# Patient Record
Sex: MALE | URBAN - NONMETROPOLITAN AREA
[De-identification: names, ages, dates, MRNs, and addresses within clinical notes are randomized per-mention and may not be internally consistent; named-entity substitution may affect disease eponyms.]

---

## 2017-01-13 ENCOUNTER — APPOINTMENT (RX ONLY)
Dept: URBAN - NONMETROPOLITAN AREA CLINIC 3 | Facility: CLINIC | Age: 77
Setting detail: DERMATOLOGY
End: 2017-01-13

## 2017-01-13 DIAGNOSIS — L57.0 ACTINIC KERATOSIS: ICD-10-CM

## 2017-01-13 DIAGNOSIS — D18.0 HEMANGIOMA: ICD-10-CM

## 2017-01-13 DIAGNOSIS — L81.4 OTHER MELANIN HYPERPIGMENTATION: ICD-10-CM

## 2017-01-13 DIAGNOSIS — L82.1 OTHER SEBORRHEIC KERATOSIS: ICD-10-CM

## 2017-01-13 DIAGNOSIS — D22 MELANOCYTIC NEVI: ICD-10-CM

## 2017-01-13 PROBLEM — D18.01 HEMANGIOMA OF SKIN AND SUBCUTANEOUS TISSUE: Status: ACTIVE | Noted: 2017-01-13

## 2017-01-13 PROBLEM — D22.9 MELANOCYTIC NEVI, UNSPECIFIED: Status: ACTIVE | Noted: 2017-01-13

## 2017-01-13 PROBLEM — D48.5 NEOPLASM OF UNCERTAIN BEHAVIOR OF SKIN: Status: ACTIVE | Noted: 2017-01-13

## 2017-01-13 PROBLEM — L57.8 OTHER SKIN CHANGES DUE TO CHRONIC EXPOSURE TO NONIONIZING RADIATION: Status: ACTIVE | Noted: 2017-01-13

## 2017-01-13 PROBLEM — L70.0 ACNE VULGARIS: Status: ACTIVE | Noted: 2017-01-13

## 2017-01-13 PROCEDURE — 99203 OFFICE O/P NEW LOW 30 MIN: CPT | Mod: 25

## 2017-01-13 PROCEDURE — 11100: CPT | Mod: 59

## 2017-01-13 PROCEDURE — 11101: CPT

## 2017-01-13 PROCEDURE — ? COUNSELING

## 2017-01-13 PROCEDURE — ? BIOPSY BY SHAVE METHOD

## 2017-01-13 PROCEDURE — 11302 SHAVE SKIN LESION 1.1-2.0 CM: CPT | Mod: 59

## 2017-01-13 PROCEDURE — ? SHAVE REMOVAL

## 2017-01-13 PROCEDURE — 17004 DESTROY PREMAL LESIONS 15/>: CPT

## 2017-01-13 PROCEDURE — ? LIQUID NITROGEN

## 2017-01-13 ASSESSMENT — LOCATION SIMPLE DESCRIPTION DERM
LOCATION SIMPLE: LEFT EAR
LOCATION SIMPLE: RIGHT FOREHEAD
LOCATION SIMPLE: RIGHT BUTTOCK
LOCATION SIMPLE: LEFT FOREHEAD
LOCATION SIMPLE: LEFT CHEEK

## 2017-01-13 ASSESSMENT — LOCATION DETAILED DESCRIPTION DERM
LOCATION DETAILED: LEFT SUPERIOR CRUS OF ANTIHELIX
LOCATION DETAILED: LEFT INFERIOR LATERAL FOREHEAD
LOCATION DETAILED: RIGHT LATERAL FOREHEAD
LOCATION DETAILED: RIGHT BUTTOCK
LOCATION DETAILED: LEFT MID PREAURICULAR CHEEK

## 2017-01-13 ASSESSMENT — PAIN INTENSITY VAS: HOW INTENSE IS YOUR PAIN 0 BEING NO PAIN, 10 BEING THE MOST SEVERE PAIN POSSIBLE?: NO PAIN

## 2017-01-13 ASSESSMENT — LOCATION ZONE DERM
LOCATION ZONE: TRUNK
LOCATION ZONE: FACE
LOCATION ZONE: EAR

## 2017-01-13 ASSESSMENT — TOTAL NUMBER OF LESIONS: # OF LESIONS?: 22

## 2017-01-13 NOTE — PROCEDURE: LIQUID NITROGEN
Total Number Of Aks Treated: [facility]
Duration Of Freeze Thaw-Cycle (Seconds): 0
Render Post-Care Instructions In Note?: no
Detail Level: Detailed
Post-Care Instructions: I reviewed with the patient in detail post-care instructions. Patient is to wear sunprotection, and avoid picking at any of the treated lesions. Pt may apply Vaseline to crusted or scabbing areas.
Consent: The patient's consent was obtained including but not limited to risks of crusting, scabbing, blistering, scarring, darker or lighter pigmentary change, recurrence, incomplete removal and infection.

## 2017-01-13 NOTE — PROCEDURE: BIOPSY BY SHAVE METHOD
Anesthesia Type: 1% lidocaine with epinephrine
Destruction After The Procedure: No
Additional Anesthesia Volume In Cc (Will Not Render If 0): 0
Cryotherapy Text: The wound bed was treated with cryotherapy after the biopsy was performed.
Post-Care Instructions: I reviewed with the patient in detail post-care instructions. Patient is to keep the biopsy site dry overnight, and then apply bacitracin twice daily until healed. Patient may apply hydrogen peroxide soaks to remove any crusting.
Hemostasis: Electrocautery
Type Of Destruction Used: Curettage
Detail Level: Detailed
Render Post-Care Instructions In Note?: yes
Size Of Lesion In Cm: 0.5
Silver Nitrate Text: The wound bed was treated with silver nitrate after the biopsy was performed.
Dressing: bandage
Electrodesiccation And Curettage Text: The wound bed was treated with electrodesiccation and curettage after the biopsy was performed.
Billing Type: [employer]
Wound Care: Bacitracin
Lab: [id number] [facility]
Biopsy Type: H and E
Notification Instructions: Patient will be notified of biopsy results. However, patient instructed to call the office if not contacted within 2 weeks.
Biopsy Method: 15 blade
Electrodesiccation Text: The wound bed was treated with electrodesiccation after the biopsy was performed.
Lab Facility: [address] [place] [address]
Path Notes (To The Dermatopathologist): Size:0.5
Curettage Text: The wound bed was treated with curettage after the biopsy was performed.
Body Location Override (Optional - Billing Will Still Be Based On Selected Body Map Location If Applicable): Right forehead
Consent: Written consent was obtained and risks were reviewed including but not limited to scarring, infection, bleeding, scabbing, incomplete removal, nerve damage and allergy to anesthesia.
Billing Type: Third-Party Bill
Lab Facility: [id number]
Lab: [id number]
Body Location Override (Optional - Billing Will Still Be Based On Selected Body Map Location If Applicable): Left dorsal forearm
Body Location Override (Optional - Billing Will Still Be Based On Selected Body Map Location If Applicable): Left abdomen
Body Location Override (Optional - Billing Will Still Be Based On Selected Body Map Location If Applicable): Left lateral deltoid

## 2017-01-13 NOTE — PROCEDURE: SHAVE REMOVAL
Wound Care: Bacitracin
Anesthesia Type: 1% lidocaine with epinephrine
Anesthesia Volume In Cc: 0.3
Post-Care Instructions: I reviewed with the patient in detail post-care instructions. Patient is to keep the biopsy site dry overnight, and then apply bacitracin twice daily until healed. Patient may apply hydrogen peroxide soaks to remove any crusting.
Body Location Override (Optional - Billing Will Still Be Based On Selected Body Map Location If Applicable): Right buttock
Notification Instructions: Patient will be notified of biopsy results. However, patient instructed to call the office if not contacted within 2 weeks.
Medical Necessity Clause: This procedure was medically necessary because the lesion that was treated was:
Bill 27980 For Specimen Handling/Conveyance To Laboratory?: no
Render Post-Care Instructions In Note?: yes
X Size Of Lesion In Cm (Optional): 0
Hemostasis: Electrocautery
Billing Type: [employer]
Size Of Lesion In Cm (Required): 1.1
Medical Necessity Information: It is in your best interest to select a reason for this procedure from the list below. All of these items fulfill various CMS LCD requirements except the new and changing color options.
Consent was obtained from the patient. The risks and benefits to therapy were discussed in detail. Specifically, the risks of infection, scarring, bleeding, prolonged wound healing, incomplete removal, allergy to anesthesia, nerve injury and recurrence were addressed. Prior to the procedure, the treatment site was clearly identified and confirmed by the patient. All components of Universal Protocol/PAUSE Rule completed.
Path Notes (To The Dermatopathologist): Size:1.1
Detail Level: Detailed
Lab Facility: [address] [place] [address]
Lab: [id number]
Biopsy Method: 15 blade

## 2017-01-13 NOTE — PROCEDURE: MIPS QUALITY
Quality 111:Pneumonia Vaccination Status For Older Adults: Pneumococcal Vaccination Previously Received
Quality 431: Preventive Care And Screening: Unhealthy Alcohol Use - Screening: Patient screened for unhealthy alcohol use using a single question and scores less than 2 times per year
Additional Notes: Flu- nov 2016 primary \\nPneumonia- few years ago\\nPOA- brother, Melissa Lloydal
Quality 47: Advance Care Plan: Advance Care Planning discussed and documented; advance care plan or surrogate decision maker documented in the medical record.
Quality 110: Preventive Care And Screening: Influenza Immunization: Influenza Immunization previously received during influenza season
Quality 130: Documentation Of Current Medications In The Medical Record: Current Medications Documented
Quality 402: Tobacco Use And Help With Quitting Among Adolescents: Patient screened for tobacco and is an ex-smoker
Detail Level: Zone

## 2017-02-13 ENCOUNTER — APPOINTMENT (RX ONLY)
Dept: URBAN - NONMETROPOLITAN AREA CLINIC 3 | Facility: CLINIC | Age: 77
Setting detail: DERMATOLOGY
End: 2017-02-13

## 2017-02-13 PROBLEM — C44.619 BASAL CELL CARCINOMA OF SKIN OF LEFT UPPER LIMB, INCLUDING SHOULDER: Status: ACTIVE | Noted: 2017-02-13

## 2017-02-13 PROCEDURE — 13121 CMPLX RPR S/A/L 2.6-7.5 CM: CPT

## 2017-02-13 PROCEDURE — ? COUNSELING

## 2017-02-13 PROCEDURE — A4550 SURGICAL TRAYS: HCPCS

## 2017-02-13 PROCEDURE — 17313 MOHS 1 STAGE T/A/L: CPT

## 2017-02-13 PROCEDURE — ? MOHS SURGERY

## 2017-02-13 NOTE — HPI: PROCEDURE (MOHS)
Has The Growth Been Previously Biopsied?: has been previously biopsied
Body Location Override (Optional): Left lateral deltoid

## 2017-02-13 NOTE — PROCEDURE: MOHS SURGERY
Eye Shield Used: No
Dressing: pressure dressing
Full Thickness Lip Wedge Repair (Flap) Text: Given the location of the defect and the proximity to free margins a full thickness wedge repair was deemed most appropriate. Using a sterile surgical marker, the appropriate repair was drawn incorporating the defect and placing the expected incisions perpendicular to the vermilion border. The vermilion border was also meticulously outlined to ensure appropriate reapproximation during the repair. The area thus outlined was incised through and through with a #15 scalpel blade. The muscularis and dermis were reaproximated with deep sutures following hemostasis. Care was taken to realign the vermilion border before proceeding with the superficial closure. Once the vermilion was realigned the superfical and mucosal closure was finished.
Consent Type: Consent 1 (Standard)
Mauc Instructions: By selecting yes to the question below the [facility] number will be added into the note. This will be calculated automatically based on the diagnosis chosen, the size entered, the body zone selected (H,M,L) and the specific indications you chose. You will also have the option to override the Mohs AUC if you disagree with the automatically calculated number and this option is found in the Case Summary tab.
Additional Primary Defect Length (In Cm): -
Bcc Infiltrative Histology Text: There were numerous aggregates of basaloid cells demonstrating an infiltrative pattern.
Keystone Flap Text: The defect edges were debeveled with a #15 scalpel blade. Given the location of the defect, shape of the defect a keystone flap was deemed most appropriate. Using a sterile surgical marker, an appropriate keystone flap was drawn incorporating the defect, outlining the appropriate donor tissue and placing the expected incisions within the relaxed skin tension lines where possible. The area thus outlined was incised deep to adipose tissue with a #15 scalpel blade. The skin margins were undermined to an appropriate distance in all directions around the primary defect and laterally outward around the flap utilizing iris scissors.
Split-Thickness Skin Graft Text: The defect edges were debeveled with a #15 scalpel blade. Given the location of the defect, shape of the defect and the proximity to free margins a split thickness skin graft was deemed most appropriate. Using a sterile surgical marker, the primary defect shape was transferred to the donor site. The split thickness graft was then harvested. The skin graft was then placed in the primary defect and oriented appropriately.
Melolabial Interpolation Flap Text: A decision was made to reconstruct the defect utilizing an interpolation axial flap and a staged reconstruction. A telfa template was made of the defect. This telfa template was then used to outline the melolabial interpolation flap. The donor area for the pedicle flap was then injected with anesthesia. The flap was excised through the skin and subcutaneous tissue down to the layer of the underlying musculature. The pedicle flap was carefully excised within this deep plane to maintain its blood supply. The edges of the donor site were undermined. The donor site was closed in a primary fashion. The pedicle was then rotated into position and sutured. Once the tube was sutured into place, adequate blood supply was confirmed with blanching and refill. The pedicle was then wrapped with xeroform gauze and dressed appropriately with a telfa and gauze bandage to ensure continued blood supply and protect the attached pedicle.
Consent (Lip)/Introductory Paragraph: The rationale for Mohs was explained to the patient and consent was obtained. The risks, benefits and alternatives to therapy were discussed in detail. Specifically, the risks of lip deformity, changes in the oral aperture, infection, scarring, bleeding, prolonged wound healing, incomplete removal, allergy to anesthesia, nerve injury and recurrence were addressed. Prior to the procedure, the treatment site was clearly identified and confirmed by the patient. All components of Universal Protocol/PAUSE Rule completed.
Repair Performed By Another Provider Text (Leave Blank If You Do Not Want): After obtaining clear surgical margins the defect was repaired by another provider.
Stage 8: Additional Anesthesia Type: 1% lidocaine with epinephrine
S Plasty Text: Given the location and shape of the defect, and the orientation of relaxed skin tension lines, an S-plasty was deemed most appropriate for repair. Using a sterile surgical marker, the appropriate outline of the S-plasty was drawn, incorporating the defect and placing the expected incisions within the relaxed skin tension lines where possible. The area thus outlined was incised deep to adipose tissue with a #15 scalpel blade. The skin margins were undermined to an appropriate distance in all directions utilizing iris scissors. The skin flaps were advanced over the defect. The opposing margins were then approximated with interrupted buried subcutaneous sutures.
Quadrants Reporting?: 0
Number Of Stages: 1
Crescentic Intermediate Repair Preamble Text (Leave Blank If You Do Not Want): Undermining was performed with blunt dissection.
Purse String (Intermediate) Text: Given the location of the defect and the characteristics of the surrounding skin a pursestring intermediate closure was deemed most appropriate. Undermining was performed circumfirentially around the surgical defect. A purstring suture was then placed and tightened.
Body Location Override (Optional - Billing Will Still Be Based On Selected Body Map Location If Applicable): Left lateral deltoid
Bilobed Transposition Flap Text: The defect edges were debeveled with a #15 scalpel blade. Given the location of the defect and the proximity to free margins a bilobed transposition flap was deemed most appropriate. Using a sterile surgical marker, an appropriate bilobe flap drawn around the defect. The area thus outlined was incised deep to adipose tissue with a #15 scalpel blade. The skin margins were undermined to an appropriate distance in all directions utilizing iris scissors.
Skin Substitute Text: The defect edges were debeveled with a #15 scalpel blade. Given the location of the defect, shape of the defect and the proximity to free margins a skin substitute graft was deemed most appropriate. The graft material was trimmed to fit the size of the defect. The graft was then placed in the primary defect and oriented appropriately.
Suture Removal: 14 days
Spiral Flap Text: The defect edges were debeveled with a #15 scalpel blade. Given the location of the defect, shape of the defect and the proximity to free margins a spiral flap was deemed most appropriate. Using a sterile surgical marker, an appropriate rotation flap was drawn incorporating the defect and placing the expected incisions within the relaxed skin tension lines where possible. The area thus outlined was incised deep to adipose tissue with a #15 scalpel blade. The skin margins were undermined to an appropriate distance in all directions utilizing iris scissors.
Cheek Interpolation Flap Text: A decision was made to reconstruct the defect utilizing an interpolation axial flap and a staged reconstruction. A telfa template was made of the defect. This telfa template was then used to outline the Cheek Interpolation flap. The donor area for the pedicle flap was then injected with anesthesia. The flap was excised through the skin and subcutaneous tissue down to the layer of the underlying musculature. The interpolation flap was carefully excised within this deep plane to maintain its blood supply. The edges of the donor site were undermined. The donor site was closed in a primary fashion. The pedicle was then rotated into position and sutured. Once the tube was sutured into place, adequate blood supply was confirmed with blanching and refill. The pedicle was then wrapped with xeroform gauze and dressed appropriately with a telfa and gauze bandage to ensure continued blood supply and protect the attached pedicle.
Melolabial Transposition Flap Text: The defect edges were debeveled with a #15 scalpel blade. Given the location of the defect and the proximity to free margins a melolabial flap was deemed most appropriate. Using a sterile surgical marker, an appropriate melolabial transposition flap was drawn incorporating the defect. The area thus outlined was incised deep to adipose tissue with a #15 scalpel blade. The skin margins were undermined to an appropriate distance in all directions utilizing iris scissors.
Posterior Auricular Interpolation Flap Text: A decision was made to reconstruct the defect utilizing an interpolation axial flap and a staged reconstruction. A telfa template was made of the defect. This telfa template was then used to outline the posterior auricular interpolation flap. The donor area for the pedicle flap was then injected with anesthesia. The flap was excised through the skin and subcutaneous tissue down to the layer of the underlying musculature. The pedicle flap was carefully excised within this deep plane to maintain its blood supply. The edges of the donor site were undermined. The donor site was closed in a primary fashion. The pedicle was then rotated into position and sutured. Once the tube was sutured into place, adequate blood supply was confirmed with blanching and refill. The pedicle was then wrapped with xeroform gauze and dressed appropriately with a telfa and gauze bandage to ensure continued blood supply and protect the attached pedicle.
Modified Advancement Flap Text: The defect edges were debeveled with a #15 scalpel blade. Given the location of the defect, shape of the defect and the proximity to free margins a modified advancement flap was deemed most appropriate. Using a sterile surgical marker, an appropriate advancement flap was drawn incorporating the defect and placing the expected incisions within the relaxed skin tension lines where possible. The area thus outlined was incised deep to adipose tissue with a #15 scalpel blade. The skin margins were undermined to an appropriate distance in all directions utilizing iris scissors.
Bilateral Helical Rim Advancement Flap Text: The defect edges were debeveled with a #15 blade scalpel. Given the location of the defect and the proximity to free margins (helical rim) a bilateral helical rim advancement flap was deemed most appropriate. Using a sterile surgical marker, the appropriate advancement flaps were drawn incorporating the defect and placing the expected incisions between the helical rim and antihelix where possible. The area thus outlined was incised through and through with a #15 scalpel blade. With a skin hook and iris scissors, the flaps were gently and sharply undermined and freed up.
Consent 3/Introductory Paragraph: I gave the patient a chance to ask questions they had about the procedure. Following this I explained the Mohs procedure and consent was obtained. The risks, benefits and alternatives to therapy were discussed in detail. Specifically, the risks of infection, scarring, bleeding, prolonged wound healing, incomplete removal, allergy to anesthesia, nerve injury and recurrence were addressed. Prior to the procedure, the treatment site was clearly identified and confirmed by the patient. All components of Universal Protocol/PAUSE Rule completed.
Consent (Marginal Mandibular)/Introductory Paragraph: The rationale for Mohs was explained to the patient and consent was obtained. The risks, benefits and alternatives to therapy were discussed in detail. Specifically, the risks of damage to the marginal mandibular branch of the facial nerve, infection, scarring, bleeding, prolonged wound healing, incomplete removal, allergy to anesthesia, and recurrence were addressed. Prior to the procedure, the treatment site was clearly identified and confirmed by the patient. All components of Universal Protocol/PAUSE Rule completed.
Xenograft Text: The defect edges were debeveled with a #15 scalpel blade. Given the location of the defect, shape of the defect and the proximity to free margins a xenograft was deemed most appropriate. The graft was then trimmed to fit the size of the defect. The graft was then placed in the primary defect and oriented appropriately.
Island Pedicle Flap Text: The defect edges were debeveled with a #15 scalpel blade. Given the location of the defect, shape of the defect and the proximity to free margins an island pedicle advancement flap was deemed most appropriate. Using a sterile surgical marker, an appropriate advancement flap was drawn incorporating the defect, outlining the appropriate donor tissue and placing the expected incisions within the relaxed skin tension lines where possible. The area thus outlined was incised deep to adipose tissue with a #15 scalpel blade. The skin margins were undermined to an appropriate distance in all directions around the primary defect and laterally outward around the island pedicle utilizing iris scissors. There was minimal undermining beneath the pedicle flap.
Island Pedicle Flap With Canthal Suspension Text: The defect edges were debeveled with a #15 scalpel blade. Given the location of the defect, shape of the defect and the proximity to free margins an island pedicle advancement flap was deemed most appropriate. Using a sterile surgical marker, an appropriate advancement flap was drawn incorporating the defect, outlining the appropriate donor tissue and placing the expected incisions within the relaxed skin tension lines where possible. The area thus outlined was incised deep to adipose tissue with a #15 scalpel blade. The skin margins were undermined to an appropriate distance in all directions around the primary defect and laterally outward around the island pedicle utilizing iris scissors. There was minimal undermining beneath the pedicle flap. A suspension suture was placed in the canthal tendon to prevent tension and prevent ectropion.
Double Island Pedicle Flap Text: The defect edges were debeveled with a #15 scalpel blade. Given the location of the defect, shape of the defect and the proximity to free margins a double island pedicle advancement flap was deemed most appropriate. Using a sterile surgical marker, an appropriate advancement flap was drawn incorporating the defect, outlining the appropriate donor tissue and placing the expected incisions within the relaxed skin tension lines where possible. The area thus outlined was incised deep to adipose tissue with a #15 scalpel blade. The skin margins were undermined to an appropriate distance in all directions around the primary defect and laterally outward around the island pedicle utilizing iris scissors. There was minimal undermining beneath the pedicle flap.
Surgical Defect Width In Cm (Optional): 1.6
Inflammation Suggestive Of Cancer Camouflage Histology Text: There was a dense lymphocytic infiltrate which prevented adequate histologic evaluation of adjacent structures.
Epidermal Sutures: 4-0 Nylon
Epidermal Autograft Text: The defect edges were debeveled with a #15 scalpel blade. Given the location of the defect, shape of the defect and the proximity to free margins an epidermal autograft was deemed most appropriate. Using a sterile surgical marker, the primary defect shape was transferred to the donor site. The epidermal graft was then harvested. The skin graft was then placed in the primary defect and oriented appropriately.
Consent 1/Introductory Paragraph: The rationale for Mohs was explained to the patient and consent was obtained. The risks, benefits and alternatives to therapy were discussed in detail. Specifically, the risks of infection, scarring, bleeding, prolonged wound healing, incomplete removal, allergy to anesthesia, nerve injury and recurrence were addressed. Prior to the procedure, the treatment site was clearly identified and confirmed by the patient. All components of Universal Protocol/PAUSE Rule completed.
O-T Plasty Text: The defect edges were debeveled with a #15 scalpel blade. Given the location of the defect, shape of the defect and the proximity to free margins an O-T plasty was deemed most appropriate. Using a sterile surgical marker, an appropriate O-T plasty was drawn incorporating the defect and placing the expected incisions within the relaxed skin tension lines where possible. The area thus outlined was incised deep to adipose tissue with a #15 scalpel blade. The skin margins were undermined to an appropriate distance in all directions utilizing iris scissors.
Bi-Rhombic Flap Text: The defect edges were debeveled with a #15 scalpel blade. Given the location of the defect and the proximity to free margins a bi-rhombic flap was deemed most appropriate. Using a sterile surgical marker, an appropriate rhombic flap was drawn incorporating the defect. The area thus outlined was incised deep to adipose tissue with a #15 scalpel blade. The skin margins were undermined to an appropriate distance in all directions utilizing iris scissors.
Subsequent Stages Histo Method Verbiage: Using a similar technique to that described above, a thin layer of tissue was removed from all areas where tumor was visible on the previous stage. The tissue was again oriented, mapped, dyed, and processed as above.
Referred To Plastics For Closure Text (Leave Blank If You Do Not Want): After obtaining clear surgical margins the patient was sent to plastics for surgical repair. The patient understands they will receive post-surgical care and follow-up from the referring physician's office.
H Plasty Text: Given the location of the defect, shape of the defect and the proximity to free margins a H-plasty was deemed most appropriate for repair. Using a sterile surgical marker, the appropriate advancement arms of the H-plasty were drawn incorporating the defect and placing the expected incisions within the relaxed skin tension lines where possible. The area thus outlined was incised deep to adipose tissue with a #15 scalpel blade. The skin margins were undermined to an appropriate distance in all directions utilizing iris scissors. The opposing advancement arms were then advanced into place in opposite direction and anchored with interrupted buried subcutaneous sutures.
Initial Size Of Lesion: 1.7
Referred To Oculoplastics For Closure Text (Leave Blank If You Do Not Want): After obtaining clear surgical margins the patient was sent to oculoplastics for surgical repair. The patient understands they will receive post-surgical care and follow-up from the referring physician's office.
Eye Protection Verbiage: Before proceeding with the stage, a plastic scleral shield was inserted. The globe was anesthetized with a few drops of 1% lidocaine with 1:100,000 epinephrine. Then, an appropriate sized scleral shield was chosen and coated with lacrilube ointment. The shield was gently inserted and left in place for the duration of each stage. After the stage was completed, the shield was gently removed.
Repair Anesthesia Method: local infiltration
A-T Advancement Flap Text: The defect edges were debeveled with a #15 scalpel blade. Given the location of the defect, shape of the defect and the proximity to free margins an A-T advancement flap was deemed most appropriate. Using a sterile surgical marker, an appropriate advancement flap was drawn incorporating the defect and placing the expected incisions within the relaxed skin tension lines where possible. The area thus outlined was incised deep to adipose tissue with a #15 scalpel blade. The skin margins were undermined to an appropriate distance in all directions utilizing iris scissors.
Consent (Temporal Branch)/Introductory Paragraph: The rationale for Mohs was explained to the patient and consent was obtained. The risks, benefits and alternatives to therapy were discussed in detail. Specifically, the risks of damage to the temporal branch of the facial nerve, infection, scarring, bleeding, prolonged wound healing, incomplete removal, allergy to anesthesia, and recurrence were addressed. Prior to the procedure, the treatment site was clearly identified and confirmed by the patient. All components of Universal Protocol/PAUSE Rule completed.
Surgeon/Pathologist Verbiage (Will Incorporate Name Of Surgeon From Intro If Not Blank): operated in two distinct and integrated capacities as the surgeon and pathologist.
Same Histology In Subsequent Stages Text: The pattern and morphology of the tumor is as described in the first stage.
Repair Type: Complex Repair
Bcc Histology Text: There were numerous aggregates of basaloid cells.
Burow's Advancement Flap Text: The defect edges were debeveled with a #15 scalpel blade. Given the location of the defect and the proximity to free margins a Burow's advancement flap was deemed most appropriate. Using a sterile surgical marker, the appropriate advancement flap was drawn incorporating the defect and placing the expected incisions within the relaxed skin tension lines where possible. The area thus outlined was incised deep to adipose tissue with a #15 scalpel blade. The skin margins were undermined to an appropriate distance in all directions utilizing iris scissors.
Muscle Hinge Flap Text: The defect edges were debeveled with a #15 scalpel blade. Given the size, depth and location of the defect and the proximity to free margins a muscle hinge flap was deemed most appropriate. Using a sterile surgical marker, an appropriate hinge flap was drawn incorporating the defect. The area thus outlined was incised with a #15 scalpel blade. The skin margins were undermined to an appropriate distance in all directions utilizing iris scissors.
Complex Repair And Flap Additional Text (Will Appearing After The Standard Complex Repair Text): The complex repair was not sufficient to completely close the primary defect. The remaining additional defect was repaired with the flap mentioned below.
Consent (Near Eyelid Margin)/Introductory Paragraph: The rationale for Mohs was explained to the patient and consent was obtained. The risks, benefits and alternatives to therapy were discussed in detail. Specifically, the risks of ectropion or eyelid deformity, infection, scarring, bleeding, prolonged wound healing, incomplete removal, allergy to anesthesia, nerve injury and recurrence were addressed. Prior to the procedure, the treatment site was clearly identified and confirmed by the patient. All components of Universal Protocol/PAUSE Rule completed.
Cheiloplasty (Less Than 50%) Text: A decision was made to reconstruct the defect with a  cheiloplasty. The defect was undermined extensively. Additional obicularis oris muscle was excised with a 15 blade scalpel. The defect was converted into a full thickness wedge, of less than 50% of the vertical height of the lip, to facilite a better cosmetic result. Small vessels were then tied off with 5-0 monocyrl. The obicularis oris, superficial fascia, adipose and dermis were then reapproximated. After the deeper layers were approximated the epidermis was reapproximated with particular care given to realign the vermilion border.
Area M Indication Text: Tumors in this location are included in Area M (cheek, forehead, scalp, neck, jawline and pretibial skin). Mohs surgery is indicated for tumors in these anatomic locations.
Mohs Case Number: [id number]
V-Y Flap Text: The defect edges were debeveled with a #15 scalpel blade. Given the location of the defect, shape of the defect and the proximity to free margins a V-Y flap was deemed most appropriate. Using a sterile surgical marker, an appropriate advancement flap was drawn incorporating the defect and placing the expected incisions within the relaxed skin tension lines where possible. The area thus outlined was incised deep to adipose tissue with a #15 scalpel blade. The skin margins were undermined to an appropriate distance in all directions utilizing iris scissors.
Consent (Nose)/Introductory Paragraph: The rationale for Mohs was explained to the patient and consent was obtained. The risks, benefits and alternatives to therapy were discussed in detail. Specifically, the risks of nasal deformity, changes in the flow of air through the nose, infection, scarring, bleeding, prolonged wound healing, incomplete removal, allergy to anesthesia, nerve injury and recurrence were addressed. Prior to the procedure, the treatment site was clearly identified and confirmed by the patient. All components of Universal Protocol/PAUSE Rule completed.
Secondary Intention Text (Leave Blank If You Do Not Want): The defect will heal with secondary intention.
Z Plasty Text: The lesion was extirpated to the level of the fat with a #15 scalpel blade. Given the location of the defect, shape of the defect and the proximity to free margins a Z-plasty was deemed most appropriate for repair. Using a sterile surgical marker, the appropriate transposition arms of the Z-plasty were drawn incorporating the defect and placing the expected incisions within the relaxed skin tension lines where possible. The area thus outlined was incised deep to adipose tissue with a #15 scalpel blade. The skin margins were undermined to an appropriate distance in all directions utilizing iris scissors. The opposing transposition arms were then transposed into place in opposite direction and anchored with interrupted buried subcutaneous sutures.
Lazy S Complex Repair Preamble Text (Leave Blank If You Do Not Want): Extensive wide undermining was performed.
X Size Of Lesion In Cm (Optional): 1.3
Crescentic Advancement Flap Text: The defect edges were debeveled with a #15 scalpel blade. Given the location of the defect and the proximity to free margins a crescentic advancement flap was deemed most appropriate. Using a sterile surgical marker, the appropriate advancement flap was drawn incorporating the defect and placing the expected incisions within the relaxed skin tension lines where possible. The area thus outlined was incised deep to adipose tissue with a #15 scalpel blade. The skin margins were undermined to an appropriate distance in all directions utilizing iris scissors.
Mastoid Interpolation Flap Text: A decision was made to reconstruct the defect utilizing an interpolation axial flap and a staged reconstruction. A telfa template was made of the defect. This telfa template was then used to outline the mastoid interpolation flap. The donor area for the pedicle flap was then injected with anesthesia. The flap was excised through the skin and subcutaneous tissue down to the layer of the underlying musculature. The pedicle flap was carefully excised within this deep plane to maintain its blood supply. The edges of the donor site were undermined. The donor site was closed in a primary fashion. The pedicle was then rotated into position and sutured. Once the tube was sutured into place, adequate blood supply was confirmed with blanching and refill. The pedicle was then wrapped with xeroform gauze and dressed appropriately with a telfa and gauze bandage to ensure continued blood supply and protect the attached pedicle.
Trilobed Flap Text: The defect edges were debeveled with a #15 scalpel blade. Given the location of the defect and the proximity to free margins a trilobed flap was deemed most appropriate. Using a sterile surgical marker, an appropriate trilobed flap drawn around the defect. The area thus outlined was incised deep to adipose tissue with a #15 scalpel blade. The skin margins were undermined to an appropriate distance in all directions utilizing iris scissors.
No Residual Tumor Seen Histology Text: There were no malignant cells seen in the sections examined.
Area L Indication Text: Tumors in this location are included in Area L (trunk and extremities). Mohs surgery is indicated for larger tumors, or tumors with aggressive histologic features, in these anatomic locations.
Postop Diagnosis: same
Interpolation Flap Text: A decision was made to reconstruct the defect utilizing an interpolation axial flap and a staged reconstruction. A telfa template was made of the defect. This telfa template was then used to outline the interpolation flap. The donor area for the pedicle flap was then injected with anesthesia. The flap was excised through the skin and subcutaneous tissue down to the layer of the underlying musculature. The interpolation flap was carefully excised within this deep plane to maintain its blood supply. The edges of the donor site were undermined. The donor site was closed in a primary fashion. The pedicle was then rotated into position and sutured. Once the tube was sutured into place, adequate blood supply was confirmed with blanching and refill. The pedicle was then wrapped with xeroform gauze and dressed appropriately with a telfa and gauze bandage to ensure continued blood supply and protect the attached pedicle.
Anesthesia Volume In Cc: 3
Hemostasis: Pressure
Closure 4 Information: This tab is for additional flaps and grafts above and beyond our usual structured repairs. Please note if you enter information here it will not currently bill and you will need to add the billing information manually.
Dorsal Nasal Flap Text: The defect edges were debeveled with a #15 scalpel blade. Given the location of the defect and the proximity to free margins a dorsal nasal flap was deemed most appropriate. Using a sterile surgical marker, an appropriate dorsal nasal flap was drawn around the defect. The area thus outlined was incised deep to adipose tissue with a #15 scalpel blade. The skin margins were undermined to an appropriate distance in all directions utilizing iris scissors.
Mucosal Advancement Flap Text: Given the location of the defect, shape of the defect and the proximity to free margins a mucosal advancement flap was deemed most appropriate. Incisions were made with a 15 blade scalpel in the appropriate fashion along the cutaneous vermilion border and the mucosal lip. The remaining actinically damaged mucosal tissue was excised. The mucosal advancement flap was then elevated to the gingival sulcus with care taken to preserve the neurovascular structures and advanced into the primary defect. Care was taken to ensure that precise realignment of the vermilion border was achieved.
Manual Repair Warning Statement: We plan on removing the manually selected variable below in favor of our much easier automatic structured text blocks found in the previous tab. We decided to do this to help make the flow better and give you the full power of structured data. Manual selection is never going to be ideal in our platform and I would encourage you to avoid using manual selection from this point on, especially since I will be sunsetting this feature. It is important that you do one of two things with the customized text below. First, you can save all of the text in a word file so you can have it for future reference. Second, transfer the text to the appropriate area in the Library tab. Lastly, if there is a flap or graft type which we do not have you need to let us know right away so I can add it in before the variable is hidden. No need to panic, we plan to give you roughly 6 months to make the change.
Mohs Histo Method Verbiage: Each section was then chromacoded and processed in the Mohs lab using the Mohs protocol and submitted for frozen section.
Consent (Spinal Accessory)/Introductory Paragraph: The rationale for Mohs was explained to the patient and consent was obtained. The risks, benefits and alternatives to therapy were discussed in detail. Specifically, the risks of damage to the spinal accessory nerve, infection, scarring, bleeding, prolonged wound healing, incomplete removal, allergy to anesthesia, and recurrence were addressed. Prior to the procedure, the treatment site was clearly identified and confirmed by the patient. All components of Universal Protocol/PAUSE Rule completed.
Deep Sutures: 4-0 Polysorb
Ear Star Wedge Flap Text: The defect edges were debeveled with a #15 blade scalpel. Given the location of the defect and the proximity to free margins (helical rim) an ear star wedge flap was deemed most appropriate. Using a sterile surgical marker, the appropriate flap was drawn incorporating the defect and placing the expected incisions between the helical rim and antihelix where possible. The area thus outlined was incised through and through with a #15 scalpel blade.
Helical Rim Advancement Flap Text: The defect edges were debeveled with a #15 blade scalpel. Given the location of the defect and the proximity to free margins (helical rim) a double helical rim advancement flap was deemed most appropriate. Using a sterile surgical marker, the appropriate advancement flaps were drawn incorporating the defect and placing the expected incisions between the helical rim and antihelix where possible. The area thus outlined was incised through and through with a #15 scalpel blade. With a skin hook and iris scissors, the flaps were gently and sharply undermined and freed up.
Ear Wedge Repair Text: A wedge excision was completed by carrying down an excision through the full thickness of the ear and cartilage with an inward facing Burow's triangle. The wound was then closed in a layered fashion.
Consent (Scalp)/Introductory Paragraph: The rationale for Mohs was explained to the patient and consent was obtained. The risks, benefits and alternatives to therapy were discussed in detail. Specifically, the risks of changes in hair growth pattern secondary to repair, infection, scarring, bleeding, prolonged wound healing, incomplete removal, allergy to anesthesia, nerve injury and recurrence were addressed. Prior to the procedure, the treatment site was clearly identified and confirmed by the patient. All components of Universal Protocol/PAUSE Rule completed.
Medical Necessity Statement: Based on my medical judgement, Mohs surgery is the most appropriate treatment for this cancer compared to other treatments.
Consent 2/Introductory Paragraph: Mohs surgery was explained to the patient and consent was obtained. The risks, benefits and alternatives to therapy were discussed in detail. Specifically, the risks of infection, scarring, bleeding, prolonged wound healing, incomplete removal, allergy to anesthesia, nerve injury and recurrence were addressed. Prior to the procedure, the treatment site was clearly identified and confirmed by the patient. All components of Universal Protocol/PAUSE Rule completed.
Cartilage Graft Text: The defect edges were debeveled with a #15 scalpel blade. Given the location of the defect, shape of the defect, the fact the defect involved a full thickness cartilage defect a cartilage graft was deemed most appropriate. An appropriate donor site was identified, cleansed, and anesthetized. The cartilage graft was then harvested and transferred to the recipient site, oriented appropriately and then sutured into place. The secondary defect was then repaired using a primary closure.
Closure 2 Information: This tab is for additional flaps and grafts, including complex repair and grafts and complex repair and flaps. You can also specify a different location for the additional defect, if the location is the same you do not need to select a new one. We will insert the automated text for the repair you select below just as we do for solitary flaps and grafts. Please note that at this time if you select a location with a different insurance zone you will need to override the ICD10 and CPT if appropriate.
Purse String (Simple) Text: Given the location of the defect and the characteristics of the surrounding skin a pursestring closure was deemed most appropriate. Undermining was performed circumfirentially around the surgical defect. A purstring suture was then placed and tightened.
Bilobed Flap Text: The defect edges were debeveled with a #15 scalpel blade. Given the location of the defect and the proximity to free margins a bilobe flap was deemed most appropriate. Using a sterile surgical marker, an appropriate bilobe flap drawn around the defect. The area thus outlined was incised deep to adipose tissue with a #15 scalpel blade. The skin margins were undermined to an appropriate distance in all directions utilizing iris scissors.
Bill For Surgical Tray: yes
Island Pedicle Flap-Requiring Vessel Identification Text: The defect edges were debeveled with a #15 scalpel blade. Given the location of the defect, shape of the defect and the proximity to free margins an island pedicle advancement flap was deemed most appropriate. Using a sterile surgical marker, an appropriate advancement flap was drawn, based on the axial vessel mentioned above, incorporating the defect, outlining the appropriate donor tissue and placing the expected incisions within the relaxed skin tension lines where possible. The area thus outlined was incised deep to adipose tissue with a #15 scalpel blade. The skin margins were undermined to an appropriate distance in all directions around the primary defect and laterally outward around the island pedicle utilizing iris scissors. There was minimal undermining beneath the pedicle flap.
O-Z Plasty Text: The defect edges were debeveled with a #15 scalpel blade. Given the location of the defect, shape of the defect and the proximity to free margins an O-Z plasty (double transposition flap) was deemed most appropriate. Using a sterile surgical marker, the appropriate transposition flaps were drawn incorporating the defect and placing the expected incisions within the relaxed skin tension lines where possible. The area thus outlined was incised deep to adipose tissue with a #15 scalpel blade. The skin margins were undermined to an appropriate distance in all directions utilizing iris scissors. Hemostasis was achieved with electrocautery. The flaps were then transposed into place, one clockwise and the other counterclockwise, and anchored with interrupted buried subcutaneous sutures.
Advancement Flap (Single) Text: The defect edges were debeveled with a #15 scalpel blade. Given the location of the defect and the proximity to free margins a single advancement flap was deemed most appropriate. Using a sterile surgical marker, an appropriate advancement flap was drawn incorporating the defect and placing the expected incisions within the relaxed skin tension lines where possible. The area thus outlined was incised deep to adipose tissue with a #15 scalpel blade. The skin margins were undermined to an appropriate distance in all directions utilizing iris scissors.
Referred To Asc For Closure Text (Leave Blank If You Do Not Want): After obtaining clear surgical margins the patient was sent to an [facility] for surgical repair. The patient understands they will receive post-surgical care and follow-up from the [facility] physician.
Closure 3 Information: This tab is for additional flaps and grafts above and beyond our usual structured repairs.  Please note if you enter information here it will not currently bill and you will need to add the billing information manually.
Repair Hemostasis (Optional): Electrocautery
Rotation Flap Text: The defect edges were debeveled with a #15 scalpel blade. Given the location of the defect, shape of the defect and the proximity to free margins a rotation flap was deemed most appropriate. Using a sterile surgical marker, an appropriate rotation flap was drawn incorporating the defect and placing the expected incisions within the relaxed skin tension lines where possible. The area thus outlined was incised deep to adipose tissue with a #15 scalpel blade. The skin margins were undermined to an appropriate distance in all directions utilizing iris scissors.
Tissue Cultured Epidermal Autograft Text: The defect edges were debeveled with a #15 scalpel blade. Given the location of the defect, shape of the defect and the proximity to free margins a tissue cultured epidermal autograft was deemed most appropriate. The graft was then trimmed to fit the size of the defect. The graft was then placed in the primary defect and oriented appropriately.
Advancement Flap (Double) Text: The defect edges were debeveled with a #15 scalpel blade. Given the location of the defect and the proximity to free margins a double advancement flap was deemed most appropriate. Using a sterile surgical marker, the appropriate advancement flaps were drawn incorporating the defect and placing the expected incisions within the relaxed skin tension lines where possible. The area thus outlined was incised deep to adipose tissue with a #15 scalpel blade. The skin margins were undermined to an appropriate distance in all directions utilizing iris scissors.
O-T Advancement Flap Text: The defect edges were debeveled with a #15 scalpel blade. Given the location of the defect, shape of the defect and the proximity to free margins an O-T advancement flap was deemed most appropriate. Using a sterile surgical marker, an appropriate advancement flap was drawn incorporating the defect and placing the expected incisions within the relaxed skin tension lines where possible. The area thus outlined was incised deep to adipose tissue with a #15 scalpel blade. The skin margins were undermined to an appropriate distance in all directions utilizing iris scissors.
Patient Name (Optional- Will Render 'the Patient' If Blank): Lo Beard
Cheek-To-Nose Interpolation Flap Text: A decision was made to reconstruct the defect utilizing an interpolation axial flap and a staged reconstruction. A telfa template was made of the defect. This telfa template was then used to outline the Cheek-To-Nose Interpolation flap. The donor area for the pedicle flap was then injected with anesthesia. The flap was excised through the skin and subcutaneous tissue down to the layer of the underlying musculature. The interpolation flap was carefully excised within this deep plane to maintain its blood supply. The edges of the donor site were undermined. The donor site was closed in a primary fashion. The pedicle was then rotated into position and sutured. Once the tube was sutured into place, adequate blood supply was confirmed with blanching and refill. The pedicle was then wrapped with xeroform gauze and dressed appropriately with a telfa and gauze bandage to ensure continued blood supply and protect the attached pedicle.
Transposition Flap Text: The defect edges were debeveled with a #15 scalpel blade. Given the location of the defect and the proximity to free margins a transposition flap was deemed most appropriate. Using a sterile surgical marker, an appropriate transposition flap was drawn incorporating the defect. The area thus outlined was incised deep to adipose tissue with a #15 scalpel blade. The skin margins were undermined to an appropriate distance in all directions utilizing iris scissors.
O-L Flap Text: The defect edges were debeveled with a #15 scalpel blade. Given the location of the defect, shape of the defect and the proximity to free margins an O-L flap was deemed most appropriate. Using a sterile surgical marker, an appropriate advancement flap was drawn incorporating the defect and placing the expected incisions within the relaxed skin tension lines where possible. The area thus outlined was incised deep to adipose tissue with a #15 scalpel blade. The skin margins were undermined to an appropriate distance in all directions utilizing iris scissors.
No Repair - Repaired With Adjacent Surgical Defect Text (Leave Blank If You Do Not Want): After obtaining clear surgical margins the defect was repaired concurrently with another surgical defect which was in close approximation.
Consent (Ear)/Introductory Paragraph: The rationale for Mohs was explained to the patient and consent was obtained. The risks, benefits and alternatives to therapy were discussed in detail. Specifically, the risks of ear deformity, infection, scarring, bleeding, prolonged wound healing, incomplete removal, allergy to anesthesia, nerve injury and recurrence were addressed. Prior to the procedure, the treatment site was clearly identified and confirmed by the patient. All components of Universal Protocol/PAUSE Rule completed.
Cheiloplasty (Complex) Text: A decision was made to reconstruct the defect with a  cheiloplasty. The defect was undermined extensively. Additional obicularis oris muscle was excised with a 15 blade scalpel. The defect was converted into a full thickness wedge to facilite a better cosmetic result. Small vessels were then tied off with 5-0 monocyrl. The obicularis oris, superficial fascia, adipose and dermis were then reapproximated. After the deeper layers were approximated the epidermis was reapproximated with particular care given to realign the vermilion border.
Area H Indication Text: Tumors in this location are included in Area H (eyelids, eyebrows, nose, lips, chin, ear, pre-auricular, post-auricular, temple, genitalia, hands, feet, ankles and areola). Tissue conservation is critical in these anatomic locations.
Surgical Defect Length In Cm (Optional): 2
Localized Dermabrasion Text: The patient was draped in routine manner. Localized dermabrasion using 3 x 17 mm wire brush was performed in routine manner to papillary dermis. This spot dermabrasion is being performed to complete skin cancer reconstruction. It also will eliminate the other sun damaged precancerous cells that are known to be part of the regional effect of a lifetime's worth of sun exposure. This localized dermabrasion is therapeutic and should not be considered cosmetic in any regard.
Paramedian Forehead Flap Text: A decision was made to reconstruct the defect utilizing an interpolation axial flap and a staged reconstruction. A telfa template was made of the defect. This telfa template was then used to outline the paramedian forehead pedicle flap. The donor area for the pedicle flap was then injected with anesthesia. The flap was excised through the skin and subcutaneous tissue down to the layer of the underlying musculature. The pedicle flap was carefully excised within this deep plane to maintain its blood supply. The edges of the donor site were undermined. The donor site was closed in a primary fashion. The pedicle was then rotated into position and sutured. Once the tube was sutured into place, adequate blood supply was confirmed with blanching and refill. The pedicle was then wrapped with xeroform gauze and dressed appropriately with a telfa and gauze bandage to ensure continued blood supply and protect the attached pedicle.
Alar Island Pedicle Flap Text: The defect edges were debeveled with a #15 scalpel blade. Given the location of the defect, shape of the defect and the proximity to the alar rim an island pedicle advancement flap was deemed most appropriate. Using a sterile surgical marker, an appropriate advancement flap was drawn incorporating the defect, outlining the appropriate donor tissue and placing the expected incisions within the nasal ala running parallel to the alar rim. The area thus outlined was incised with a #15 scalpel blade. The skin margins were undermined minimally to an appropriate distance in all directions around the primary defect and laterally outward around the island pedicle utilizing iris scissors. There was minimal undermining beneath the pedicle flap.
Ftsg Text: The defect edges were debeveled with a #15 scalpel blade. Given the location of the defect, shape of the defect and the proximity to free margins a full thickness skin graft was deemed most appropriate. Using a sterile surgical marker, the primary defect shape was transferred to the donor site. The area thus outlined was incised deep to adipose tissue with a #15 scalpel blade. The harvested graft was then trimmed of adipose tissue until only dermis and epidermis was left. The skin margins of the secondary defect were undermined to an appropriate distance in all directions utilizing iris scissors. The secondary defect was closed with interrupted buried subcutaneous sutures. The skin edges were then re-apposed with running  sutures. The skin graft was then placed in the primary defect and oriented appropriately.
Complex Repair And Graft Additional Text (Will Appearing After The Standard Complex Repair Text): The complex repair was not sufficient to completely close the primary defect. The remaining additional defect was repaired with the graft mentioned below.
Alternatives Discussed Intro (Do Not Add Period): I discussed alternative treatments to Mohs surgery and specifically discussed the risks and benefits of
Simple / Intermediate / Complex Repair - Final Wound Length In Cm: 3.2
Detail Level: Detailed
Advancement-Rotation Flap Text: The defect edges were debeveled with a #15 scalpel blade. Given the location of the defect, shape of the defect and the proximity to free margins an advancement-rotation flap was deemed most appropriate. Using a sterile surgical marker, an appropriate flap was drawn incorporating the defect and placing the expected incisions within the relaxed skin tension lines where possible. The area thus outlined was incised deep to adipose tissue with a #15 scalpel blade. The skin margins were undermined to an appropriate distance in all directions utilizing iris scissors.
Rhombic Flap Text: The defect edges were debeveled with a #15 scalpel blade. Given the location of the defect and the proximity to free margins a rhombic flap was deemed most appropriate. Using a sterile surgical marker, an appropriate rhombic flap was drawn incorporating the defect. The area thus outlined was incised deep to adipose tissue with a #15 scalpel blade. The skin margins were undermined to an appropriate distance in all directions utilizing iris scissors.
Hatchet Flap Text: The defect edges were debeveled with a #15 scalpel blade. Given the location of the defect, shape of the defect and the proximity to free margins a hatchet flap was deemed most appropriate. Using a sterile surgical marker, an appropriate hatchet flap was drawn incorporating the defect and placing the expected incisions within the relaxed skin tension lines where possible. The area thus outlined was incised deep to adipose tissue with a #15 scalpel blade. The skin margins were undermined to an appropriate distance in all directions utilizing iris scissors.
Epidermal Closure: running
Composite Graft Text: The defect edges were debeveled with a #15 scalpel blade. Given the location of the defect, shape of the defect, the proximity to free margins and the fact the defect was full thickness a composite graft was deemed most appropriate. The defect was outline and then transferred to the donor site. A full thickness graft was then excised from the donor site. The graft was then placed in the primary defect, oriented appropriately and then sutured into place. The secondary defect was then repaired using a primary closure.
Mohs Method Verbiage: An incision at a 45 degree angle following the standard Mohs approach was done and the specimen was harvested as a microscopic controlled layer.
Dermal Autograft Text: The defect edges were debeveled with a #15 scalpel blade. Given the location of the defect, shape of the defect and the proximity to free margins a dermal autograft was deemed most appropriate. Using a sterile surgical marker, the primary defect shape was transferred to the donor site. The area thus outlined was incised deep to adipose tissue with a #15 scalpel blade. The harvested graft was then trimmed of adipose and epidermal tissue until only dermis was left. The skin graft was then placed in the primary defect and oriented appropriately.
Surgeon: Kell Zimmerman MD
Surgeon Performing Repair (Optional): Kim Spatz, MD
V-Y Plasty Text: The defect edges were debeveled with a #15 scalpel blade. Given the location of the defect, shape of the defect and the proximity to free margins an V-Y advancement flap was deemed most appropriate. Using a sterile surgical marker, an appropriate advancement flap was drawn incorporating the defect and placing the expected incisions within the relaxed skin tension lines where possible. The area thus outlined was incised deep to adipose tissue with a #15 scalpel blade. The skin margins were undermined to an appropriate distance in all directions utilizing iris scissors.
Mohs Rapid Report Verbiage: The area of clinically evident tumor was marked with skin marking ink and appropriately hatched. The initial incision was made following the Mohs approach through the skin. The specimen was taken to the lab, divided into the necessary number of pieces, chromacoded and processed according to the Mohs protocol. This was repeated in successive stages until a tumor free defect was achieved.
Date Of Previous Biopsy (Optional): 01/13/17
Referred To Otolaryngology For Closure Text (Leave Blank If You Do Not Want): After obtaining clear surgical margins the patient was sent to otolaryngology for surgical repair. The patient understands they will receive post-surgical care and follow-up from the referring physician's office.
W Plasty Text: The lesion was extirpated to the level of the fat with a #15 scalpel blade. Given the location of the defect, shape of the defect and the proximity to free margins a W-plasty was deemed most appropriate for repair. Using a sterile surgical marker, the appropriate transposition arms of the W-plasty were drawn incorporating the defect and placing the expected incisions within the relaxed skin tension lines where possible. The area thus outlined was incised deep to adipose tissue with a #15 scalpel blade. The skin margins were undermined to an appropriate distance in all directions utilizing iris scissors. The opposing transposition arms were then transposed into place in opposite direction and anchored with interrupted buried subcutaneous sutures.
Estimated Blood Loss (Cc): minimal

## 2017-04-07 ENCOUNTER — APPOINTMENT (RX ONLY)
Dept: URBAN - NONMETROPOLITAN AREA CLINIC 3 | Facility: CLINIC | Age: 77
Setting detail: DERMATOLOGY
End: 2017-04-07

## 2017-04-07 DIAGNOSIS — L57.0 ACTINIC KERATOSIS: ICD-10-CM

## 2017-04-07 DIAGNOSIS — D22 MELANOCYTIC NEVI: ICD-10-CM

## 2017-04-07 DIAGNOSIS — L81.4 OTHER MELANIN HYPERPIGMENTATION: ICD-10-CM

## 2017-04-07 DIAGNOSIS — D485 NEOPLASM OF UNCERTAIN BEHAVIOR OF SKIN: ICD-10-CM

## 2017-04-07 PROBLEM — D48.5 NEOPLASM OF UNCERTAIN BEHAVIOR OF SKIN: Status: ACTIVE | Noted: 2017-04-07

## 2017-04-07 PROBLEM — D22.9 MELANOCYTIC NEVI, UNSPECIFIED: Status: ACTIVE | Noted: 2017-04-07

## 2017-04-07 PROCEDURE — 17004 DESTROY PREMAL LESIONS 15/>: CPT

## 2017-04-07 PROCEDURE — 11100: CPT | Mod: 59

## 2017-04-07 PROCEDURE — ? LIQUID NITROGEN

## 2017-04-07 PROCEDURE — 99213 OFFICE O/P EST LOW 20 MIN: CPT | Mod: 25

## 2017-04-07 PROCEDURE — ? COUNSELING

## 2017-04-07 PROCEDURE — ? BIOPSY BY SHAVE METHOD

## 2017-04-07 PROCEDURE — 11101: CPT

## 2017-04-07 ASSESSMENT — LOCATION SIMPLE DESCRIPTION DERM
LOCATION SIMPLE: RIGHT HAND
LOCATION SIMPLE: LEFT CHEEK
LOCATION SIMPLE: LEFT FOREARM
LOCATION SIMPLE: LEFT HAND
LOCATION SIMPLE: RIGHT FOREARM
LOCATION SIMPLE: HAIR

## 2017-04-07 ASSESSMENT — LOCATION DETAILED DESCRIPTION DERM
LOCATION DETAILED: RIGHT PROXIMAL DORSAL FOREARM
LOCATION DETAILED: LEFT INFERIOR MEDIAL MALAR CHEEK
LOCATION DETAILED: LEFT SUPERIOR LATERAL BUCCAL CHEEK
LOCATION DETAILED: LEFT VENTRAL PROXIMAL FOREARM
LOCATION DETAILED: LEFT RADIAL DORSAL HAND
LOCATION DETAILED: RIGHT ULNAR DORSAL HAND
LOCATION DETAILED: HAIR
LOCATION DETAILED: LEFT PROXIMAL DORSAL FOREARM
LOCATION DETAILED: RIGHT VENTRAL PROXIMAL FOREARM

## 2017-04-07 ASSESSMENT — TOTAL NUMBER OF LESIONS: # OF LESIONS?: 25

## 2017-04-07 ASSESSMENT — LOCATION ZONE DERM
LOCATION ZONE: ARM
LOCATION ZONE: SCALP
LOCATION ZONE: FACE
LOCATION ZONE: HAND

## 2017-04-07 ASSESSMENT — PAIN INTENSITY VAS: HOW INTENSE IS YOUR PAIN 0 BEING NO PAIN, 10 BEING THE MOST SEVERE PAIN POSSIBLE?: NO PAIN

## 2017-04-07 NOTE — PROCEDURE: LIQUID NITROGEN
Render Post-Care Instructions In Note?: no
Consent: The patient's consent was obtained including but not limited to risks of crusting, scabbing, blistering, scarring, darker or lighter pigmentary change, recurrence, incomplete removal and infection.
Total Number Of Aks Treated: 25
Post-Care Instructions: I reviewed with the patient in detail post-care instructions. Patient is to wear sunprotection, and avoid picking at any of the treated lesions. Pt may apply Vaseline to crusted or scabbing areas.
Detail Level: Zone
Duration Of Freeze Thaw-Cycle (Seconds): 0

## 2017-04-07 NOTE — PROCEDURE: BIOPSY BY SHAVE METHOD
Render Post-Care Instructions In Note?: no
Curettage Text: The wound bed was treated with curettage after the biopsy was performed.
Lab: [id number]
Electrodesiccation And Curettage Text: The wound bed was treated with electrodesiccation and curettage after the biopsy was performed.
Notification Instructions: Patient will be notified of biopsy results. However, patient instructed to call the office if not contacted within 2 weeks.
Type Of Destruction Used: Curettage
Body Location Override (Optional - Billing Will Still Be Based On Selected Body Map Location If Applicable): Left flank
Size Of Lesion In Cm: 0.5
Lab Facility: [id number]
Detail Level: Detailed
Biopsy Type: H and E
Cryotherapy Text: The wound bed was treated with cryotherapy after the biopsy was performed.
Consent: Written consent was obtained and risks were reviewed including but not limited to scarring, infection, bleeding, scabbing, incomplete removal, nerve damage and allergy to anesthesia.
Biopsy Method: Double edge Personna blades
Billing Type: [employer]
Silver Nitrate Text: The wound bed was treated with silver nitrate after the biopsy was performed.
Anesthesia Type: 1% lidocaine with epinephrine
Additional Anesthesia Volume In Cc (Will Not Render If 0): 0
Wound Care: Bacitracin
Hemostasis: Drysol
Post-Care Instructions: I reviewed with the patient in detail post-care instructions. Patient is to keep the biopsy site dry overnight, and then apply bacitracin twice daily until healed. Patient may apply hydrogen peroxide soaks to remove any crusting.
Electrodesiccation Text: The wound bed was treated with electrodesiccation after the biopsy was performed.
Path Notes (To The Dermatopathologist): Size:0.5
Dressing: bandage
Lab: [id number]
Billing Type: Third-Party Bill
Lab Facility: [id number]
Body Location Override (Optional - Billing Will Still Be Based On Selected Body Map Location If Applicable): Left superior shoulder
Path Notes (To The Dermatopathologist): Size:0.6
Size Of Lesion In Cm: 0.6
Body Location Override (Optional - Billing Will Still Be Based On Selected Body Map Location If Applicable): Left mandible
Lab Facility: [id number]

## 2021-10-26 NOTE — PROCEDURE: MIPS QUALITY
26-Oct-2021 00:44
Detail Level: Zone
Quality 431: Preventive Care And Screening: Unhealthy Alcohol Use - Screening: Patient screened for unhealthy alcohol use using a single question and scores less than 2 times per year
Quality 226: Preventive Care And Screening: Tobacco Use: Screening And Cessation Intervention: Patient screened for tobacco and never smoked
Quality 131: Pain Assessment And Follow-Up: Pain assessment using a standardized tool is documented as negative, no follow-up plan required
Quality 134: Screening For Clinical Depression And Follow-Up Plan: The patient was screened for depression and the screen was negative and no follow up required
Quality 110: Preventive Care And Screening: Influenza Immunization: Influenza Immunization Ordered or Recommended, but not Administered

## 2025-06-18 ENCOUNTER — HOSPITAL ENCOUNTER (OUTPATIENT)
Dept: RADIOLOGY | Facility: MEDICAL CENTER | Age: 85
End: 2025-06-18
Payer: MEDICARE

## 2025-06-18 NOTE — PROGRESS NOTES
[facility]OSPITAL[facility] TRIAGE OFFICER DIRECT ADMISSION REPORT  Transferring facility: [facility]  Transferring physician: Jag  Transferring facility/physician contact number:   Chief complaint: Encephalopathy  Pertinent history & patient course: Patient admitted to [facility], initially thought to have alcohol withdrawal however has not been improving with his encephalopathy.  Had extensive workup including LP and multiple advanced imaging studies without any revealing findings.  Teleneurology at that facility recommended transfer to higher level of care for continuous EEG for further seizure workup.  He has not improved yet on low-dose Keppra 250.  Has continued agitation and encephalopathy.  Pertinent imaging & lab results: Records available in media  Code Status: Full  Further work up or recommendations per triage officer prior to transfer: None  Consultants called prior to transfer and pertinent input from consultants: None  Patient accepted for transfer: Yes  Consultants to be called upon arrival: Consider neurology  Admission status: Inpatient.   Floor requested: Neuro  If ICU transfer, name of intensivist case discussed with and pertinent input from critical care: NA     Please inform the triage officer upon arrival of the patient to [facility] for assignment of a hospitalist to perform admission.      For any question or concerns regarding the care of this patient, please reach out to the assigned hospita

## 2025-06-19 ENCOUNTER — APPOINTMENT (OUTPATIENT)
Dept: RADIOLOGY | Facility: MEDICAL CENTER | Age: 85
DRG: 070 | End: 2025-06-19
Attending: STUDENT IN AN ORGANIZED HEALTH CARE EDUCATION/TRAINING PROGRAM
Payer: MEDICARE

## 2025-06-19 ENCOUNTER — HOSPITAL ENCOUNTER (INPATIENT)
Facility: MEDICAL CENTER | Age: 85
LOS: 4 days | DRG: 070 | End: 2025-06-23
Attending: STUDENT IN AN ORGANIZED HEALTH CARE EDUCATION/TRAINING PROGRAM | Admitting: STUDENT IN AN ORGANIZED HEALTH CARE EDUCATION/TRAINING PROGRAM
Payer: MEDICARE

## 2025-06-19 DIAGNOSIS — G93.40 ENCEPHALOPATHY, UNSPECIFIED TYPE: Primary | ICD-10-CM

## 2025-06-19 DIAGNOSIS — B02.9 HERPES ZOSTER WITHOUT COMPLICATION: ICD-10-CM

## 2025-06-19 PROBLEM — J96.01 ACUTE HYPOXIC RESPIRATORY FAILURE (HCC): Status: ACTIVE | Noted: 2025-06-19

## 2025-06-19 PROBLEM — D53.9 MACROCYTIC ANEMIA: Status: ACTIVE | Noted: 2025-06-19

## 2025-06-19 PROBLEM — E87.1 HYPONATREMIA: Status: ACTIVE | Noted: 2025-06-19

## 2025-06-19 LAB
ALBUMIN SERPL BCP-MCNC: 2.9 G/DL (ref 3.2–4.9)
ALBUMIN/GLOB SERPL: 1 G/DL
ALP SERPL-CCNC: 57 U/L (ref 30–99)
ALT SERPL-CCNC: 10 U/L (ref 2–50)
AMMONIA PLAS-SCNC: 24 UMOL/L (ref 11–45)
AMPHET UR QL SCN: NEGATIVE
ANION GAP SERPL CALC-SCNC: 12 MMOL/L (ref 7–16)
APPEARANCE UR: ABNORMAL
AST SERPL-CCNC: 18 U/L (ref 12–45)
B PARAP IS1001 DNA NPH QL NAA+NON-PROBE: NOT DETECTED
B PERT.PT PRMT NPH QL NAA+NON-PROBE: NOT DETECTED
BACTERIA #/AREA URNS HPF: ABNORMAL /HPF
BARBITURATES UR QL SCN: NEGATIVE
BASE EXCESS BLDV CALC-SCNC: -1 MMOL/L (ref -2–3)
BASOPHILS # BLD AUTO: 0.6 % (ref 0–1.8)
BASOPHILS # BLD: 0.06 K/UL (ref 0–0.12)
BENZODIAZ UR QL SCN: POSITIVE
BILIRUB SERPL-MCNC: 0.4 MG/DL (ref 0.1–1.5)
BILIRUB UR QL STRIP.AUTO: NEGATIVE
BODY TEMPERATURE: 37.3 CENTIGRADE
BUN SERPL-MCNC: 27 MG/DL (ref 8–22)
BZE UR QL SCN: NEGATIVE
C PNEUM DNA NPH QL NAA+NON-PROBE: NOT DETECTED
CALCIUM ALBUM COR SERPL-MCNC: 9.2 MG/DL (ref 8.5–10.5)
CALCIUM SERPL-MCNC: 8.3 MG/DL (ref 8.5–10.5)
CANNABINOIDS UR QL SCN: NEGATIVE
CASTS URNS QL MICRO: ABNORMAL /LPF (ref 0–2)
CHLORIDE SERPL-SCNC: 117 MMOL/L (ref 96–112)
CK SERPL-CCNC: 94 U/L (ref 0–154)
CO2 SERPL-SCNC: 20 MMOL/L (ref 20–33)
COLOR UR: ABNORMAL
CREAT SERPL-MCNC: 0.95 MG/DL (ref 0.5–1.4)
CRP SERPL HS-MCNC: 11.1 MG/DL (ref 0–0.75)
D DIMER PPP IA.FEU-MCNC: 4.47 UG/ML (FEU) (ref 0–0.5)
EKG IMPRESSION: NORMAL
EOSINOPHIL # BLD AUTO: 0.13 K/UL (ref 0–0.51)
EOSINOPHIL NFR BLD: 1.2 % (ref 0–6.9)
EPITHELIAL CELLS 1715: ABNORMAL /HPF (ref 0–5)
ERYTHROCYTE [DISTWIDTH] IN BLOOD BY AUTOMATED COUNT: 48.9 FL (ref 35.9–50)
ERYTHROCYTE [SEDIMENTATION RATE] IN BLOOD BY WESTERGREN METHOD: 133 MM/HOUR (ref 0–20)
FENTANYL UR QL: NEGATIVE
FERRITIN SERPL-MCNC: 131 NG/ML (ref 22–322)
FLUAV RNA NPH QL NAA+NON-PROBE: NOT DETECTED
FLUBV RNA NPH QL NAA+NON-PROBE: NOT DETECTED
FOLATE SERPL-MCNC: 31.8 NG/ML
GFR SERPLBLD CREATININE-BSD FMLA CKD-EPI: 78 ML/MIN/1.73 M 2
GLOBULIN SER CALC-MCNC: 2.8 G/DL (ref 1.9–3.5)
GLUCOSE SERPL-MCNC: 124 MG/DL (ref 65–99)
GLUCOSE UR STRIP.AUTO-MCNC: NEGATIVE MG/DL
HADV DNA NPH QL NAA+NON-PROBE: NOT DETECTED
HCO3 BLDV-SCNC: 26 MMOL/L (ref 22–29)
HCOV 229E RNA NPH QL NAA+NON-PROBE: NOT DETECTED
HCOV HKU1 RNA NPH QL NAA+NON-PROBE: NOT DETECTED
HCOV NL63 RNA NPH QL NAA+NON-PROBE: NOT DETECTED
HCOV OC43 RNA NPH QL NAA+NON-PROBE: NOT DETECTED
HCT VFR BLD AUTO: 32 % (ref 42–52)
HGB BLD-MCNC: 10.2 G/DL (ref 14–18)
HGB RETIC QN AUTO: 32.7 PG/CELL (ref 29–35)
HMPV RNA NPH QL NAA+NON-PROBE: NOT DETECTED
HPIV1 RNA NPH QL NAA+NON-PROBE: NOT DETECTED
HPIV2 RNA NPH QL NAA+NON-PROBE: NOT DETECTED
HPIV3 RNA NPH QL NAA+NON-PROBE: NOT DETECTED
HPIV4 RNA NPH QL NAA+NON-PROBE: NOT DETECTED
IMM GRANULOCYTES # BLD AUTO: 0.08 K/UL (ref 0–0.11)
IMM GRANULOCYTES NFR BLD AUTO: 0.7 % (ref 0–0.9)
IMM RETICS NFR: 13 % (ref 2.6–16.1)
IRON SATN MFR SERPL: 7 % (ref 15–55)
IRON SERPL-MCNC: 15 UG/DL (ref 50–180)
KETONES UR STRIP.AUTO-MCNC: 15 MG/DL
LACTATE SERPL-SCNC: 1.1 MMOL/L (ref 0.5–2)
LEUKOCYTE ESTERASE UR QL STRIP.AUTO: NEGATIVE
LYMPHOCYTES # BLD AUTO: 0.92 K/UL (ref 1–4.8)
LYMPHOCYTES NFR BLD: 8.6 % (ref 22–41)
M PNEUMO DNA NPH QL NAA+NON-PROBE: NOT DETECTED
MAGNESIUM SERPL-MCNC: 2.1 MG/DL (ref 1.5–2.5)
MCH RBC QN AUTO: 31.8 PG (ref 27–33)
MCHC RBC AUTO-ENTMCNC: 31.9 G/DL (ref 32.3–36.5)
MCV RBC AUTO: 99.7 FL (ref 81.4–97.8)
METHADONE UR QL SCN: NEGATIVE
MICRO URNS: ABNORMAL
MONOCYTES # BLD AUTO: 1.03 K/UL (ref 0–0.85)
MONOCYTES NFR BLD AUTO: 9.6 % (ref 0–13.4)
NEUTROPHILS # BLD AUTO: 8.53 K/UL (ref 1.82–7.42)
NEUTROPHILS NFR BLD: 79.3 % (ref 44–72)
NITRITE UR QL STRIP.AUTO: NEGATIVE
NRBC # BLD AUTO: 0 K/UL
NRBC BLD-RTO: 0 /100 WBC (ref 0–0.2)
NT-PROBNP SERPL IA-MCNC: 751 PG/ML (ref 0–125)
OPIATES UR QL SCN: NEGATIVE
OXYCODONE UR QL SCN: NEGATIVE
PCO2 BLDV: 52.4 MMHG (ref 38–54)
PCO2 TEMP ADJ BLDV: 53.1 MMHG (ref 38–54)
PCP UR QL SCN: NEGATIVE
PH BLDV: 7.29 [PH] (ref 7.31–7.45)
PH TEMP ADJ BLDV: 7.29 [PH] (ref 7.31–7.45)
PH UR STRIP.AUTO: 5.5 [PH] (ref 5–8)
PHOSPHATE SERPL-MCNC: 3.3 MG/DL (ref 2.5–4.5)
PLATELET # BLD AUTO: 172 K/UL (ref 164–446)
PMV BLD AUTO: 9.2 FL (ref 9–12.9)
PO2 BLDV: 38.3 MMHG (ref 23–48)
PO2 TEMP ADJ BLDV: 39.2 MMHG (ref 23–48)
POTASSIUM SERPL-SCNC: 3.7 MMOL/L (ref 3.6–5.5)
PROLACTIN SERPL-MCNC: 14.5 NG/ML (ref 2.1–17.7)
PROPOXYPH UR QL SCN: NEGATIVE
PROT SERPL-MCNC: 5.7 G/DL (ref 6–8.2)
PROT UR QL STRIP: 100 MG/DL
RBC # BLD AUTO: 3.21 M/UL (ref 4.7–6.1)
RBC # URNS HPF: ABNORMAL /HPF (ref 0–2)
RBC UR QL AUTO: ABNORMAL
RETICS # AUTO: 0.06 M/UL (ref 0.04–0.12)
RETICS/RBC NFR: 1.7 % (ref 0.8–2.6)
RSV RNA NPH QL NAA+NON-PROBE: NOT DETECTED
RV+EV RNA NPH QL NAA+NON-PROBE: NOT DETECTED
SAO2 % BLDV: 57 % (ref 60–85)
SARS-COV-2 RNA NPH QL NAA+NON-PROBE: NOTDETECTED
SCCMEC + MECA PNL NOSE NAA+PROBE: NEGATIVE
SODIUM SERPL-SCNC: 149 MMOL/L (ref 135–145)
SP GR UR STRIP.AUTO: 1.04
T PALLIDUM AB SER QL IA: NORMAL
TIBC SERPL-MCNC: 215 UG/DL (ref 250–450)
TROPONIN T SERPL-MCNC: 39 NG/L (ref 6–19)
TSH SERPL DL<=0.005 MIU/L-ACNC: 1.17 UIU/ML (ref 0.38–5.33)
UIBC SERPL-MCNC: 200 UG/DL (ref 110–370)
UROBILINOGEN UR STRIP.AUTO-MCNC: 1 EU/DL
VANCOMYCIN TROUGH SERPL-MCNC: 11.5 UG/ML (ref 10–20)
VIT B12 SERPL-MCNC: 1164 PG/ML (ref 211–911)
WBC # BLD AUTO: 10.8 K/UL (ref 4.8–10.8)
WBC #/AREA URNS HPF: ABNORMAL /HPF

## 2025-06-19 PROCEDURE — 97602 WOUND(S) CARE NON-SELECTIVE: CPT

## 2025-06-19 PROCEDURE — 95819 EEG AWAKE AND ASLEEP: CPT | Performed by: STUDENT IN AN ORGANIZED HEALTH CARE EDUCATION/TRAINING PROGRAM

## 2025-06-19 PROCEDURE — 82803 BLOOD GASES ANY COMBINATION: CPT

## 2025-06-19 PROCEDURE — 84146 ASSAY OF PROLACTIN: CPT

## 2025-06-19 PROCEDURE — 85652 RBC SED RATE AUTOMATED: CPT

## 2025-06-19 PROCEDURE — 85379 FIBRIN DEGRADATION QUANT: CPT

## 2025-06-19 PROCEDURE — 93010 ELECTROCARDIOGRAM REPORT: CPT | Performed by: INTERNAL MEDICINE

## 2025-06-19 PROCEDURE — A9270 NON-COVERED ITEM OR SERVICE: HCPCS | Performed by: STUDENT IN AN ORGANIZED HEALTH CARE EDUCATION/TRAINING PROGRAM

## 2025-06-19 PROCEDURE — 85025 COMPLETE CBC W/AUTO DIFF WBC: CPT

## 2025-06-19 PROCEDURE — 83605 ASSAY OF LACTIC ACID: CPT

## 2025-06-19 PROCEDURE — 86140 C-REACTIVE PROTEIN: CPT

## 2025-06-19 PROCEDURE — 700102 HCHG RX REV CODE 250 W/ 637 OVERRIDE(OP): Performed by: STUDENT IN AN ORGANIZED HEALTH CARE EDUCATION/TRAINING PROGRAM

## 2025-06-19 PROCEDURE — 82140 ASSAY OF AMMONIA: CPT

## 2025-06-19 PROCEDURE — 36415 COLL VENOUS BLD VENIPUNCTURE: CPT

## 2025-06-19 PROCEDURE — 83540 ASSAY OF IRON: CPT

## 2025-06-19 PROCEDURE — 80053 COMPREHEN METABOLIC PANEL: CPT

## 2025-06-19 PROCEDURE — 80307 DRUG TEST PRSMV CHEM ANLYZR: CPT

## 2025-06-19 PROCEDURE — 87040 BLOOD CULTURE FOR BACTERIA: CPT

## 2025-06-19 PROCEDURE — 81001 URINALYSIS AUTO W/SCOPE: CPT

## 2025-06-19 PROCEDURE — 80177 DRUG SCRN QUAN LEVETIRACETAM: CPT

## 2025-06-19 PROCEDURE — 85046 RETICYTE/HGB CONCENTRATE: CPT

## 2025-06-19 PROCEDURE — 83880 ASSAY OF NATRIURETIC PEPTIDE: CPT

## 2025-06-19 PROCEDURE — 0202U NFCT DS 22 TRGT SARS-COV-2: CPT

## 2025-06-19 PROCEDURE — 71045 X-RAY EXAM CHEST 1 VIEW: CPT

## 2025-06-19 PROCEDURE — 87641 MR-STAPH DNA AMP PROBE: CPT

## 2025-06-19 PROCEDURE — 82746 ASSAY OF FOLIC ACID SERUM: CPT

## 2025-06-19 PROCEDURE — 86780 TREPONEMA PALLIDUM: CPT

## 2025-06-19 PROCEDURE — 700105 HCHG RX REV CODE 258: Performed by: STUDENT IN AN ORGANIZED HEALTH CARE EDUCATION/TRAINING PROGRAM

## 2025-06-19 PROCEDURE — 82607 VITAMIN B-12: CPT

## 2025-06-19 PROCEDURE — 99223 1ST HOSP IP/OBS HIGH 75: CPT | Mod: AI | Performed by: STUDENT IN AN ORGANIZED HEALTH CARE EDUCATION/TRAINING PROGRAM

## 2025-06-19 PROCEDURE — 82728 ASSAY OF FERRITIN: CPT

## 2025-06-19 PROCEDURE — 84443 ASSAY THYROID STIM HORMONE: CPT

## 2025-06-19 PROCEDURE — 84484 ASSAY OF TROPONIN QUANT: CPT

## 2025-06-19 PROCEDURE — 83735 ASSAY OF MAGNESIUM: CPT

## 2025-06-19 PROCEDURE — 700111 HCHG RX REV CODE 636 W/ 250 OVERRIDE (IP): Performed by: STUDENT IN AN ORGANIZED HEALTH CARE EDUCATION/TRAINING PROGRAM

## 2025-06-19 PROCEDURE — 4A10X4Z MONITORING OF CENTRAL NERVOUS ELECTRICAL ACTIVITY, EXTERNAL APPROACH: ICD-10-PCS | Performed by: STUDENT IN AN ORGANIZED HEALTH CARE EDUCATION/TRAINING PROGRAM

## 2025-06-19 PROCEDURE — 95819 EEG AWAKE AND ASLEEP: CPT | Mod: 26 | Performed by: STUDENT IN AN ORGANIZED HEALTH CARE EDUCATION/TRAINING PROGRAM

## 2025-06-19 PROCEDURE — 93005 ELECTROCARDIOGRAM TRACING: CPT | Mod: TC | Performed by: STUDENT IN AN ORGANIZED HEALTH CARE EDUCATION/TRAINING PROGRAM

## 2025-06-19 PROCEDURE — 83550 IRON BINDING TEST: CPT

## 2025-06-19 PROCEDURE — 770020 HCHG ROOM/CARE - TELE (206)

## 2025-06-19 PROCEDURE — 82550 ASSAY OF CK (CPK): CPT

## 2025-06-19 PROCEDURE — 80202 ASSAY OF VANCOMYCIN: CPT

## 2025-06-19 PROCEDURE — 84100 ASSAY OF PHOSPHORUS: CPT

## 2025-06-19 RX ORDER — ONDANSETRON 4 MG/1
4 TABLET, ORALLY DISINTEGRATING ORAL EVERY 4 HOURS PRN
Status: DISCONTINUED | OUTPATIENT
Start: 2025-06-19 | End: 2025-06-19

## 2025-06-19 RX ORDER — DIAZEPAM 10 MG/2ML
2.5 INJECTION, SOLUTION INTRAMUSCULAR; INTRAVENOUS
Status: DISCONTINUED | OUTPATIENT
Start: 2025-06-19 | End: 2025-06-21

## 2025-06-19 RX ORDER — POLYETHYLENE GLYCOL 3350 17 G/17G
1 POWDER, FOR SOLUTION ORAL
Status: DISCONTINUED | OUTPATIENT
Start: 2025-06-19 | End: 2025-06-19

## 2025-06-19 RX ORDER — ONDANSETRON 4 MG/1
4 TABLET, ORALLY DISINTEGRATING ORAL EVERY 4 HOURS PRN
Status: DISCONTINUED | OUTPATIENT
Start: 2025-06-19 | End: 2025-06-23 | Stop reason: HOSPADM

## 2025-06-19 RX ORDER — FINASTERIDE 5 MG/1
5 TABLET, FILM COATED ORAL DAILY
COMMUNITY
Start: 2025-04-21

## 2025-06-19 RX ORDER — METOPROLOL SUCCINATE 100 MG/1
100 TABLET, EXTENDED RELEASE ORAL DAILY
Status: ON HOLD | COMMUNITY
End: 2025-06-23

## 2025-06-19 RX ORDER — LEVETIRACETAM 500 MG/1
500 TABLET ORAL 2 TIMES DAILY
Status: DISCONTINUED | OUTPATIENT
Start: 2025-06-19 | End: 2025-06-19

## 2025-06-19 RX ORDER — ENOXAPARIN SODIUM 100 MG/ML
40 INJECTION SUBCUTANEOUS DAILY
Status: DISCONTINUED | OUTPATIENT
Start: 2025-06-19 | End: 2025-06-23 | Stop reason: HOSPADM

## 2025-06-19 RX ORDER — OXYBUTYNIN CHLORIDE 5 MG/1
5 TABLET ORAL DAILY
Status: ON HOLD | COMMUNITY
Start: 2025-04-01 | End: 2025-06-23

## 2025-06-19 RX ORDER — TAMSULOSIN HYDROCHLORIDE 0.4 MG/1
0.4 CAPSULE ORAL 2 TIMES DAILY
Status: ON HOLD | COMMUNITY
Start: 2025-05-20 | End: 2025-06-23

## 2025-06-19 RX ORDER — POLYETHYLENE GLYCOL 3350 17 G/17G
1 POWDER, FOR SOLUTION ORAL
Status: DISCONTINUED | OUTPATIENT
Start: 2025-06-19 | End: 2025-06-21

## 2025-06-19 RX ORDER — LOSARTAN POTASSIUM 100 MG/1
100 TABLET ORAL DAILY
COMMUNITY
Start: 2025-05-08

## 2025-06-19 RX ORDER — SCOPOLAMINE 1 MG/3D
1 PATCH, EXTENDED RELEASE TRANSDERMAL
Status: DISCONTINUED | OUTPATIENT
Start: 2025-06-19 | End: 2025-06-21

## 2025-06-19 RX ORDER — OMEPRAZOLE 20 MG/1
20 CAPSULE, DELAYED RELEASE ORAL DAILY
Status: ON HOLD | COMMUNITY
End: 2025-06-23

## 2025-06-19 RX ORDER — ONDANSETRON 2 MG/ML
4 INJECTION INTRAMUSCULAR; INTRAVENOUS EVERY 4 HOURS PRN
Status: DISCONTINUED | OUTPATIENT
Start: 2025-06-19 | End: 2025-06-23 | Stop reason: HOSPADM

## 2025-06-19 RX ORDER — LEVETIRACETAM 500 MG/5ML
500 INJECTION, SOLUTION, CONCENTRATE INTRAVENOUS EVERY 12 HOURS
Status: DISCONTINUED | OUTPATIENT
Start: 2025-06-19 | End: 2025-06-19

## 2025-06-19 RX ORDER — ACETAMINOPHEN 325 MG/1
650 TABLET ORAL EVERY 6 HOURS PRN
Status: DISCONTINUED | OUTPATIENT
Start: 2025-06-19 | End: 2025-06-19

## 2025-06-19 RX ORDER — HYDROCHLOROTHIAZIDE 25 MG/1
25 TABLET ORAL DAILY
COMMUNITY

## 2025-06-19 RX ORDER — AMOXICILLIN 250 MG
2 CAPSULE ORAL EVERY EVENING
Status: DISCONTINUED | OUTPATIENT
Start: 2025-06-19 | End: 2025-06-21

## 2025-06-19 RX ORDER — ACETAMINOPHEN 325 MG/1
650 TABLET ORAL EVERY 6 HOURS PRN
Status: DISCONTINUED | OUTPATIENT
Start: 2025-06-19 | End: 2025-06-23 | Stop reason: HOSPADM

## 2025-06-19 RX ORDER — ATORVASTATIN CALCIUM 20 MG/1
20 TABLET, FILM COATED ORAL DAILY
COMMUNITY
Start: 2025-05-06

## 2025-06-19 RX ORDER — AMLODIPINE BESYLATE 10 MG/1
10 TABLET ORAL DAILY
COMMUNITY

## 2025-06-19 RX ORDER — HYDRALAZINE HYDROCHLORIDE 20 MG/ML
10 INJECTION INTRAMUSCULAR; INTRAVENOUS EVERY 4 HOURS PRN
Status: DISCONTINUED | OUTPATIENT
Start: 2025-06-19 | End: 2025-06-23 | Stop reason: HOSPADM

## 2025-06-19 RX ORDER — ONDANSETRON 2 MG/ML
4 INJECTION INTRAMUSCULAR; INTRAVENOUS EVERY 4 HOURS PRN
Status: DISCONTINUED | OUTPATIENT
Start: 2025-06-19 | End: 2025-06-19

## 2025-06-19 RX ORDER — DEXTROSE MONOHYDRATE AND SODIUM CHLORIDE 5; .45 G/100ML; G/100ML
INJECTION, SOLUTION INTRAVENOUS CONTINUOUS
Status: DISCONTINUED | OUTPATIENT
Start: 2025-06-19 | End: 2025-06-21

## 2025-06-19 RX ORDER — AMOXICILLIN 250 MG
2 CAPSULE ORAL EVERY EVENING
Status: DISCONTINUED | OUTPATIENT
Start: 2025-06-19 | End: 2025-06-19

## 2025-06-19 RX ADMIN — THIAMINE HYDROCHLORIDE 400 MG: 100 INJECTION, SOLUTION INTRAMUSCULAR; INTRAVENOUS at 04:49

## 2025-06-19 RX ADMIN — SCOPOLAMINE 1 PATCH: 1.5 PATCH, EXTENDED RELEASE TRANSDERMAL at 17:58

## 2025-06-19 RX ADMIN — THIAMINE HYDROCHLORIDE 400 MG: 100 INJECTION, SOLUTION INTRAMUSCULAR; INTRAVENOUS at 20:56

## 2025-06-19 RX ADMIN — HYDRALAZINE HYDROCHLORIDE 10 MG: 20 INJECTION INTRAMUSCULAR; INTRAVENOUS at 23:37

## 2025-06-19 RX ADMIN — ENOXAPARIN SODIUM 40 MG: 100 INJECTION SUBCUTANEOUS at 17:58

## 2025-06-19 RX ADMIN — PIPERACILLIN AND TAZOBACTAM 4.5 G: 4; .5 INJECTION, POWDER, FOR SOLUTION INTRAVENOUS at 04:11

## 2025-06-19 RX ADMIN — THIAMINE HYDROCHLORIDE 400 MG: 100 INJECTION, SOLUTION INTRAMUSCULAR; INTRAVENOUS at 14:28

## 2025-06-19 RX ADMIN — PIPERACILLIN AND TAZOBACTAM 4.5 G: 4; .5 INJECTION, POWDER, FOR SOLUTION INTRAVENOUS at 20:53

## 2025-06-19 RX ADMIN — DEXTROSE AND SODIUM CHLORIDE: 5; 450 INJECTION, SOLUTION INTRAVENOUS at 22:36

## 2025-06-19 RX ADMIN — VANCOMYCIN HYDROCHLORIDE 1250 MG: 5 INJECTION, POWDER, LYOPHILIZED, FOR SOLUTION INTRAVENOUS at 06:25

## 2025-06-19 RX ADMIN — DEXTROSE AND SODIUM CHLORIDE: 5; 450 INJECTION, SOLUTION INTRAVENOUS at 13:50

## 2025-06-19 RX ADMIN — LEVETIRACETAM 500 MG: 100 INJECTION, SOLUTION INTRAVENOUS at 05:23

## 2025-06-19 RX ADMIN — PIPERACILLIN AND TAZOBACTAM 4.5 G: 4; .5 INJECTION, POWDER, FOR SOLUTION INTRAVENOUS at 09:32

## 2025-06-19 RX ADMIN — ENOXAPARIN SODIUM 40 MG: 100 INJECTION SUBCUTANEOUS at 04:51

## 2025-06-19 RX ADMIN — PIPERACILLIN AND TAZOBACTAM 4.5 G: 4; .5 INJECTION, POWDER, FOR SOLUTION INTRAVENOUS at 14:26

## 2025-06-19 ASSESSMENT — COPD QUESTIONNAIRES
HAVE YOU SMOKED AT LEAST 100 CIGARETTES IN YOUR ENTIRE LIFE: NO/DON'T KNOW
COPD SCREENING SCORE: 3
DO YOU EVER COUGH UP ANY MUCUS OR PHLEGM?: NO/ONLY WITH OCCASIONAL COLDS OR INFECTIONS
DURING THE PAST 4 WEEKS HOW MUCH DID YOU FEEL SHORT OF BREATH: SOME OF THE TIME

## 2025-06-19 ASSESSMENT — PAIN DESCRIPTION - PAIN TYPE
TYPE: ACUTE PAIN

## 2025-06-19 NOTE — DISCHARGE PLANNING
Case Management Discharge Planning    Admission Date: 6/19/2025  GMLOS:    ALOS: 0    6-Clicks ADL Score:    6-Clicks Mobility Score:        Anticipated Discharge Dispo: Discharge Disposition: D/T to critical access hospital (66)    DME Needed: No    Action(s) Taken: Patient discussed during IDT rounds.  Patient was transferred from [facility] for an EEG.  Plan is to initiate transfer back agreement once EEG is completed and neurology recommendations are in place.    Escalations Completed: None    Medically Clear: No    Next Steps: Case management to follow for medical clearance.    Barriers to Discharge: Medical clearance, transfer back arrangements.    Is the patient up for discharge tomorrow: Potentially

## 2025-06-19 NOTE — PROGRESS NOTES
4 Eyes Skin Assessment Completed by WAN Perry and WAN Da Silva    Skin assessment is primarily focused on high risk bony prominences. Pay special attention to skin beneath and around medical devices, high risk bony prominences, skin to skin areas and areas where the patient lacks sensation to feel pain and areas where the patient previously had breakdown.     HIGH RISK PRESSURE POINTS -  Sacrum/Coccyx WDL  Right ischial tuberosity (Sit Bones) WDL  Left Ischial Tuberosity (Sit Bones) WDL  Right Heel Pink  Left Heel [place]    HEAD & NECK DEVICES:  Occipital Region WDL  Right Ear WDL  Left Ear WDL  Chin WDL  Neck WDL  Sternum WDL  NA, Intact with no device in use    RESPIRATORY DEVICES:  Lips Dry, cracked  Tongue Yellow and coated  Right Cheek WDL  Left Cheek WDL  Bridge of Nose WDL  Respiratory Devices (Wound care): Pulse Ox    FEEDING DEVICES:  Nasal Septum WDL  Feeding Devices (Wound Care): NA    TORSO DEVICES:  Scapula Edema  Spine Edema  Back Edema  Abdomen Hard, rounded  Right Flank Hard  Left Flank Hard  Torso Devices (Wound Care): NA    LINES, BP MONITORING DEVICES IN USE:  Right Elbow, Forearm, Wrist, Hand Red, Bruising, Edema, and Weeping  Left Elbow, Forearm, Wrist, Hand Skin Tear, Red, Bruising, Edema, and Weeping  Lines & BP Monitoring Devices (Wound Care): Midline IV and Pulse Ox    ORTHOPEDIC DEVICES:  Right Hip WDL  Left Hip WDL  Right Thigh Rash, Red, and shingles  Left Thigh WDL  Right Leg WDL  Left Leg WDL  Right Achilles Red and Blanching  Left Achilles Red and Blanching  Right Foot Boggy and Edema  Left Foot Boggy and Edema  Orthopedic Devices (Wound Care): NA    MISCELLANEOUS:  Axilla WDL  Groin Red and Blanching  Perineum Red and Blanching  Buttocks/Sulma-rectal WDL  Miscellaneous (Wound Care): Telemetry Monitor, Condom Cath, and SCDs    PROTOCOL INTERVENTIONS:   Low Airloss Bed Already in Place  Heel Float Boots Applied this Assessment  Q2 Turns with Pillows Already in Place  Glide Sheet Already  in Place  Condom Cath/Purewick Already in Place    WOUND PHOTOS:   Completed and in EPIC     WOUND CONSULT:   Consult ordered for the following areas right thigh rash/shngles

## 2025-06-19 NOTE — H&P
Hospital Medicine History & Physical Note    Date of Service  6/19/2025    Primary Care Physician  No primary care provider on file.    Consultants  none    Specialist Names: N/A    Code Status  Full Code    Chief Complaint  No chief complaint on file.      History of Presenting Illness  Patient is an 84-year-old male with past medical history of BPH, HLD, HTN, depression, alcohol use disorder that presented to [facility] on 6/13/2025 with acute encephalopathy.  Admitted with mild hypokalemia, mild KAYLIE, and mild leukocytosis.  CT head showing remote left basal ganglia lacunar infarct.  CTA head and neck without acute abnormality.  Patient was started on IV fluids and high-dose IV thiamine, as well as CIWA protocol.  Teleneurology was consulted recommending LP and continuous EEG to rule out nonconvulsive status epilepticus.  Received Keppra 2 g IV x 1 load on 6/17/2025.  Patient was subsequently started on broad-spectrum antibiotics for concern of underlying infection.  TTE on 6/15/2025 without vegetation or thrombus, or any valvular or regional wall abnormality.  Blood culture 6/13 and 6/16 no growth to date.  Patient was started on IV ceftriaxone 6/14/2025 to 6/16/2025 for possible UTI given bilateral perinephric stranding seen on CT abdomen pelvis, but without improvement was brought into IV vancomycin and IV Zosyn.  Patient additionally underwent LP with bland fluid analysis, WBC 1, RBC 1, protein 72, glucose 73, total of 14 cells were seen, of which 13 were monocytes and 1 more PMN.  Patient also developed acute hypoxic respiratory failure prior to transfer with chest x-ray at outside facility showing small lung volumes, otherwise unremarkable.    Patient was transferred to [facility] for higher level of care, including EEG and neurology consultation.    At outside hospital, CT head without contrast 6/13/2025 without acute abnormality. CT head without contrast 6/18/2025 without acute abnormality.  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  CTA head and neck 6/13/2025 without acute abnormality.  CT abdomen pelvis without contrast 6/14/2025 with bilateral perinephric soft tissue stranding, diverticulosis of the sigmoid colon, small fat-containing left inguinal hernia. LP on 6/17/2025 with CSF clearing colorless, 1 WBC, 1 RBC, 72 protein (high), 73 glucose (high). COVID/flu/RSV negative. Syphilis nonreactive. HIV nonreactive. UDS negative, alcohol negative, salicylate negative, Tylenol negative. UA 6/13/2025 with 30 protein, negative nitrite, negative leukocyte esterase, WBC 0-2, RBC 16-25, rare bacteria. TSH 1.499, B12 1159, folate 32.5.    On admission, WBC 10.8, hemoglobin 10.2, , sodium 149, potassium 3.7, bicarb 20, anion gap 12, BUN 27, creatinine 0.95, corrected calcium 9.2, CPK 94, ammonia 24, lactic acid 1.1, B12 1164, TSH 1.17, CRP 11.1, prolactin 14.5, .  Chest x-ray without acute abnormality.    I discussed the plan of care with patient.    Review of Systems  Review of Systems   Unable to perform ROS: Medical condition       Past Medical History   has no past medical history on file.    Surgical History   has no past surgical history on file.     Family History  No family history on file.     Family history reviewed with patient. There is no family history that is pertinent to the chief complaint.     Social History       Allergies  Allergies[1]    Medications  None       Physical Exam  Temp:  [36.5 °C (97.7 °F)-36.6 °C (97.8 °F)] 36.5 °C (97.7 °F)  Pulse:  [85-86] 85  Resp:  [18] 18  BP: (164-165)/(71) 165/71  SpO2:  [94 %] 94 %                          Physical Exam  Vitals reviewed.   Constitutional:       General: He is not in acute distress.     Appearance: He is ill-appearing. He is not toxic-appearing or diaphoretic.      Comments: Obtunded   HENT:      Head: Normocephalic and atraumatic.   Cardiovascular:      Rate and  "Rhythm: Normal rate and regular rhythm.      Heart sounds: Normal heart sounds. No murmur heard.     No friction rub. No gallop.   Pulmonary:      Effort: Pulmonary effort is normal. No respiratory distress.      Breath sounds: Normal breath sounds. No stridor. No wheezing, rhonchi or rales.   Abdominal:      General: Abdomen is flat. There is no distension.      Palpations: Abdomen is soft. There is no mass.      Tenderness: There is no abdominal tenderness. There is no guarding or rebound.      Hernia: No hernia is present.   Musculoskeletal:         General: No swelling or tenderness. Normal range of motion.      Right lower leg: No edema.      Left lower leg: No edema.   Skin:     General: Skin is warm and dry.      Coloration: Skin is not jaundiced.   Neurological:      Mental Status: He is disoriented.         Laboratory:  Recent Labs     06/19/25  0311   WBC 10.8   RBC 3.21*   HEMOGLOBIN 10.2*   HEMATOCRIT 32.0*   MCV 99.7*   MCH 31.8   MCHC 31.9*   RDW 48.9   PLATELETCT 172   MPV 9.2     Recent Labs     06/19/25  0311   SODIUM 149*   POTASSIUM 3.7   CHLORIDE 117*   CO2 20   GLUCOSE 124*   BUN 27*   CREATININE 0.95   CALCIUM 8.3*     Recent Labs     06/19/25  0311   ALTSGPT 10   ASTSGOT 18   ALKPHOSPHAT 57   TBILIRUBIN 0.4   GLUCOSE 124*         No results for input(s): \"NTPROBNP\" in the last 72 hours.      No results for input(s): \"TROPONINT\" in the last 72 hours.    Imaging:  DX-CHEST-PORTABLE (1 VIEW)   Final Result         1.  No acute cardiopulmonary disease.   2.  Cardiomegaly   3.  Atherosclerosis          X-Ray:  I have personally reviewed the images and compared with prior images.    Assessment/Plan:  Justification for Admission Status  I anticipate this patient will require at least two midnights for appropriate medical management, necessitating inpatient admission because of encephalopathy of unknown etiology requiring further workup and neurology consultation    Patient will need a Telemetry bed " on NEUROLOGY service .  The need is secondary to encephalopathy of unknown etiology requiring further workup and neurology consultation.    * Encephalopathy- (present on admission)  Assessment & Plan  Please refer to HPI in H&P for hospital course at [facility]. Effectively extensive negative workup with transfer to [facility] for EEG and neurology consultation for possible seizure.    -Day team to consult neurology  -Seizure precautions  -Telemetry  -Order EEG  -Order levels of current AEDs  -Order LA, CPK, prolactin  -Diazepam 2.5 mg IV q5min as needed for seizure  -Keppra 500mg IV twice daily  -Thiamine 400 mg IV every 8 hours for 3 days  -Continue IV vancomycin and IV Zosyn  -Strict NPO  -SLP consult  -Follow blood cultures at [facility]    Acute hypoxic respiratory failure (HCC)- (present on admission)  Assessment & Plan  Patient requiring 4 L nasal cannula on admission.    -Order CXR, full respiratory panel, VBG, EKG, troponin, NT proBNP, D-dimer  -RT protocol  -Supplemental O2 as needed  -Wean oxygen as tolerated  -Incentive spirometry    Hyponatremia- (present on admission)  Assessment & Plan  Sodium 149 on admission.    - Daily BMP    Macrocytic anemia- (present on admission)  Assessment & Plan  Hemoglobin 10.2 and MCV 99.7.  No sign of acute bleed.    - Daily CBC  - Order iron panel, ferritin, reticulocyte count, TSH/FT4, B12, folate        VTE prophylaxis: SCDs/TEDs and enoxaparin ppx         [1] Not on File

## 2025-06-19 NOTE — DISCHARGE PLANNING
Case Management Discharge Planning    Admission Date: 6/19/2025  GMLOS:    ALOS: 0    TRANSFER BACK PATIENT    Referring Facility: [facility]  Reason for Transfer: Continuous EEG    Signed Repatriation/Transfer Back Agreement saved in .    Once this patient has received the requested service or the patient no longer requires tertiary level of care from [facility] and is stable to transfer back to referring facility, we can facilitate a transfer back. Please contact the [facility] at [phone] to coordinate this transfer request.

## 2025-06-19 NOTE — ASSESSMENT & PLAN NOTE
Patient requiring 4 L nasal cannula on admission.  Negative workup, the patient has had aspiration risk  -RT protocol  -Supplemental O2 as needed  -Wean oxygen as tolerated  -Incentive spirometry

## 2025-06-19 NOTE — ASSESSMENT & PLAN NOTE
Negative extensive workup including LP, MRI,  Neurology consulted, no further suggestions  Ongoing supportive care

## 2025-06-19 NOTE — PROGRESS NOTES
"Pharmacy Vancomycin Kinetics Note for 6/19/2025     84 y.o. male on Vancomycin day # 1   Vancomycin Indication (Trough based Dosing): Sepsis (goal trough 15-20)    Provider specified end date: 06/24/25    Active Antibiotics (From admission, onward)      Ordered     Ordering Provider       Thu Jun 19, 2025  5:22 AM    06/19/25 0522  vancomycin (Vancocin) 1,250 mg in  mL IVPB  (vancomycin (VANCOCIN) IV (LD + Maintenance))  ONCE         Russell Lawrence M.D.       Thu Jun 19, 2025  2:55 AM    06/19/25 0255  MD Alert...Vancomycin per Pharmacy  (MD Alert...Vancomycin per Pharmacy)  PHARMACY TO DOSE        Question:  Indication(s) for vancomycin?  Answer:  Unknown source of infection    Russell Lawrence M.D.    06/19/25 0255  piperacillin-tazobactam (Zosyn) 4.5 g in  mL IVPB  (piperacillin-tazobactam (ZOSYN) IV (Extended-infusion) PANEL )  ONCE        Placed in \"And\" Linked Group    Russell Lawrence M.D.    06/19/25 0255  piperacillin-tazobactam (Zosyn) 4.5 g in  mL IVPB  (piperacillin-tazobactam (ZOSYN) IV (Extended-infusion) PANEL )  EVERY 8 HOURS        Placed in \"And\" Linked Group    Russell Lawrence M.D.            Dosing Weight: 83.5 kg (184 lb 1.4 oz)      Admission History: Admitted on 6/19/2025 for Encephalopathy [G93.40]  Pertinent history: Patient transfering encephalopathic with unknown source of incfection starting emperic MRSA and pseudemonal coverage.    Allergies:     Patient has no allergy information on record.     Pertinent cultures to date:     Results       Procedure Component Value Units Date/Time    MRSA By PCR (Amp) [[id number]]     Order Status: No result Specimen: Respirate from Nares     BLOOD CULTURE [[id number]]     Order Status: Sent Specimen: Blood from Peripheral     URINALYSIS [[id number]]     Order Status: No result Specimen: Urine, Clean Catch     BLOOD CULTURE [[id number]]     Order Status: Sent Specimen: Blood from Peripheral     Respiratory Panel by PCR " (Inpatient ONLY) [[id number]]     Order Status: No result Specimen: Respirate from Nasopharyngeal             Labs:     CrCl cannot be calculated (Unknown ideal weight.).  Recent Labs     25  031   WBC 10.8   NEUTSPOLYS 79.30*     Recent Labs     25  031   BUN 27*   CREATININE 0.95   ALBUMIN 2.9*     No intake or output data in the 24 hours ending 25 0523   BP (!) 165/71   Pulse 85   Temp 36.5 °C (97.7 °F) (Temporal)   Resp 18   Wt 83.5 kg (184 lb 1.4 oz)   SpO2 94%  Temp (24hrs), Av.6 °C (97.8 °F), Min:36.5 °C (97.7 °F), Max:36.6 °C (97.8 °F)      List concerns for Vancomycin clearance:     None    Pharmacokinetics:     Trough kinetics:   Recent Labs     25  0421   VANCOTROUGH 11.5       A/P:     -  Vancomycin dose: Patient maintenance dose at outside hospital 1000 mg every 12 hours; patient increased to 1250 once pulse dose.    -  Next vancomycin level(s):    Likely next draw 1700    -  Comments: Patient transferred in on 1000 mg every 12 hour, but last trough was not done at steady state so a stat once vanco trough was drawn that came back as 11.5. even accounting for drop from true trough patient likely low and was increased to 1250 mg once pulse dose follow up for need to dose adjust if trough is above trough goal    Yfn Spencer, PharmD

## 2025-06-19 NOTE — PROCEDURES
INPATIENT ROUTINE VIDEO ELECTROENCEPHALOGRAM REPORT    REFERRING PROVIDER: Barry Whyte M.d.  DOS: 6/19/2025  STUDY DURATION: 0 hours and 37 minutes of total recording time.     INDICATION:  Kishor Nash 84 y.o. male presenting with altered mental status    RELEVANT MEDICATIONS/TREATMENTS:   Scheduled Medications[1]    TECHNIQUE:   Routine VEEG was set up by a Neurodiagnostic technologist who performed education to the patient and staff. A minimum of 23 electrodes and 23 channel recording was setup and performed by Neurodiagnostic technologist, in accordance with the international 10-20 system. The study was reviewed in bipolar and referential montages. The recording examined the patient in the  awake, drowsy, and sleep state(s).     DESCRIPTION OF THE RECORD:  During wakefulness, the background was continuous and showed a poorly sustained 7 Hz posterior dominant rhythm.  There was reactivity to eye closure/opening.  Occasionally, a poorly formedanterior-posterior gradient was noted with faster frequencies seen anteriorly.  During drowsiness, theta/delta frequencies were seen.    EEG Sleep: Sleep was captured and was characterized by diffuse background delta/theta activity with a loss of myogenic artifact.  N2 sleep transients in the form of sleep spindles and vertex waves were seen in the leads over the central regions.     ICTAL AND INTERICTAL FINDINGS:   No focal or generalized epileptiform activity noted.     Scattered triphasic waves were occasionally present.    No regional slowing or persistent focal asymmetries were seen.    No seizures.     ACTIVATION PROCEDURES:   Intermittent Photic stimulation was performed in a stepwise fashion from 1 to 30 Hz and did not elicited any abnormalities on EEG.     EKG: Sampling of the EKG recording showed sinus rhythm    EVENTS:  None    INTERPRETATION:   Abnormal video EEG recording in the awake, drowsy, and sleep state(s):  - Mild continuous generalized slowing of the  background.   - Scattered triphasic waves were occasionally present.  - No regional slowing or persistent focal asymmetries were seen.  - No epileptiform discharges were seen.  - No seizures.       Comments: The findings are suggestive of mild diffuse cerebral dysfunction of a nonspecific etiology, possibly related to an underlying toxic/metabolic process. Clinical correlation recommended.     Kate Jewell MD  Department of Neurology at [facility]  General Neurologist and Epileptologist   of Clinical Neurology, [facility].          [1]   Scheduled Medications   Medication Dose Frequency    enoxaparin (LOVENOX) injection  40 mg DAILY AT 1800    levETIRAcetam (Keppra) IV  500 mg Q12HRS    thiamine  400 mg Q8HRS    piperacillin-tazobactam  4.5 g Q8HRS    Pharmacy  1 Each PHARMACY TO DOSE    senna-docusate  2 Tablet Q EVENING

## 2025-06-19 NOTE — CARE PLAN
The patient is Watcher - Medium risk of patient condition declining or worsening    Shift Goals  Clinical Goals: Stable neuro status, maintain skin integrity  Patient Goals: AMELIA  Family Goals: Updates    Progress made toward(s) clinical / shift goals:    Problem: Fall Risk  Goal: Patient will remain free from falls  Description: Target End Date:  Prior to discharge or change in level of care    Document interventions on the Violeta Peraza Fall Risk Assessment    1.  Assess for fall risk factors  2.  Implement fall precautions  Outcome: Progressing     Problem: Skin Integrity  Goal: Skin integrity is maintained or improved  Description: Target End Date:  Prior to discharge or change in level of care    Document interventions on Skin Risk/Vega flowsheet groups and corresponding LDA    1.  Assess and monitor skin integrity, appearance and/or temperature  2.  Assess risk factors for impaired skin integrity and/or pressures ulcers  3.  Implement precautions to protect skin integrity in collaboration with interdisciplinary team  4.  Implement pressure ulcer prevention protocol if at risk for skin breakdown  5.  Confirm wound care consult if at risk for skin breakdown  6.  Ensure patient use of pressure relieving devices  (Low air loss bed, waffle overlay, heel protectors, ROHO cushion, etc)  Outcome: Progressing     Problem: Infection  Goal: Will remain free from infection  Outcome: Progressing       Patient is not progressing towards the following goals:      Problem: Communication  Goal: The ability to communicate needs accurately and effectively will improve  Outcome: Not Progressing

## 2025-06-19 NOTE — PROGRESS NOTES
4 Eyes Skin Assessment Completed by WAN Child and WAN Moncada.    Skin assessment is primarily focused on high risk bony prominences. Pay special attention to skin beneath and around medical devices, high risk bony prominences, skin to skin areas and areas where the patient lacks sensation to feel pain and areas where the patient previously had breakdown.     Head (Occipital):  Yellow-clear area bilateral sclera   Ears (Under Medical Devices): WDL   Nose (Under Medical Devices): WDL   Mouth:  WDL   Neck: WDL   Breast/Chest:  WDL   Shoulder Blades:  WDL   Spine:   WDL   (R) Arm/Elbow/Hand: Red, Blanching, Bruising, and Edema   (L) Arm/Elbow/Hand: Red, Blanching, Bruising, and Edema, Skin tear on antecubital area   Abdomen: WDL   Pannus/Groin:  Red, Blanching, and Excoriation/scratched   Sacrum/Coccyx:   Red and Blanching   (R) Ischial Tuberosity (Sit Bones):  WDL   (L) Ischial Tuberosity (Sit Bones):  WDL   (R) Leg:  Edema, Red spots on thigh   (L) Leg:  Edema   (R) Heel:  Red and Blanching   (R) Foot/Toe: WDL   (L) Heel: Red and Blanching   (L) Foot/Toe:  WDL       DEVICES IN USE:   Respiratory Devices:  Nasal cannula  Feeding Devices:  N/A   Lines & BP Monitoring Devices:  Midline IV and Pulse ox    Orthopedic Devices:  N/A  Miscellaneous Devices:  Telemetry monitor, Condom cath, and SCDs    PROTOCOL INTERVENTIONS:   Low Airloss Bed:  Applied this assessment  Heel Float Boots:  Applied this assessment  Q2 Turns with Pillows:  Applied this assessment  Nasal Cannula with Gray Foams:  Applied this assessment    WOUND PHOTOS:   Completed and in EPIC     WOUND CONSULT:   Consult ordered for the following areas Right, left thighs

## 2025-06-19 NOTE — THERAPY
Speech Language Therapy Contact Note    Patient Name: Kishor Nash  Age:  84 y.o., Sex:  male  Medical Record #: [id number]  Today's Date: 6/19/2025 06/19/25 0835   Treatment Variance   Reason For Missed Therapy Medical - Patient Unarousable;Medical - Patient  in Procedure   Initial Contact Note    Initial Contact Note  Order Received and Verified, Speech Therapy Evaluation in Progress with Full Report to Follow.   Interdisciplinary Plan of Care Collaboration   IDT Collaboration with  Nursing   Collaboration Comments Orders recieved for CSE, chart reviewed. Attempted to see patient; per RN, patient is not arousable and is being connected to EEG. SLP to hold at this time and re-attempt as appropriate and as schedule allows.     - Ailyn Alvarez, SLP

## 2025-06-19 NOTE — PROGRESS NOTES
"Hospitalist progress note:  84 y M here with encephalopathy of unclear etiology. Hx of HTN, HLD, BPH, alcohol use, transferred from [facility] after 5 days of hospitalization with no improvement in encephalopathy. 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 Patient transferred here for EEG to rule out subclinical seizures. Patient continued on empiric antibiotics for possible infection although source of infection unclear at this point.  EEG today negative for seizure activity.  Patient able to wake up a bit for me and say some words. He asked me \"why do you want to know\" when I asked him his name, as well as \"why am I here in the hospital\" when I told him he was in the hospital. He otherwise mumbles and did not follow commands for me.  Spoke with wife Lauryn on phone, she states he has one drink per day but would not call him a heavy alcohol user. Discussed plan of care with Lauryn, all questions answered. Plan to monitor mentation. If no improvement will consider getting repeat MRI brain in next day or so.  Patient with some gurgling of mucous, requiring ongoing suctioning.  Continue aspiration, seizure, fall precautions. Monitor neuro checks.  Scopolamine patch for secretions.  Lauryn agreeable to NG tube placement and tube nutrition. NG tube and feeding tube orders placed.  Also start IV fluids given NPO status.  Continue thiamine.  "

## 2025-06-20 LAB
ALBUMIN SERPL BCP-MCNC: 2.8 G/DL (ref 3.2–4.9)
ALBUMIN/GLOB SERPL: 0.9 G/DL
ALP SERPL-CCNC: 60 U/L (ref 30–99)
ALT SERPL-CCNC: 12 U/L (ref 2–50)
ANION GAP SERPL CALC-SCNC: 8 MMOL/L (ref 7–16)
AST SERPL-CCNC: 25 U/L (ref 12–45)
BILIRUB SERPL-MCNC: 0.4 MG/DL (ref 0.1–1.5)
BUN SERPL-MCNC: 22 MG/DL (ref 8–22)
CALCIUM ALBUM COR SERPL-MCNC: 9.2 MG/DL (ref 8.5–10.5)
CALCIUM SERPL-MCNC: 8.2 MG/DL (ref 8.5–10.5)
CHLORIDE SERPL-SCNC: 117 MMOL/L (ref 96–112)
CO2 SERPL-SCNC: 21 MMOL/L (ref 20–33)
CREAT SERPL-MCNC: 0.83 MG/DL (ref 0.5–1.4)
CRP SERPL HS-MCNC: 10 MG/DL (ref 0–0.75)
ERYTHROCYTE [DISTWIDTH] IN BLOOD BY AUTOMATED COUNT: 47 FL (ref 35.9–50)
GFR SERPLBLD CREATININE-BSD FMLA CKD-EPI: 85 ML/MIN/1.73 M 2
GLOBULIN SER CALC-MCNC: 3 G/DL (ref 1.9–3.5)
GLUCOSE SERPL-MCNC: 148 MG/DL (ref 65–99)
HCT VFR BLD AUTO: 32.2 % (ref 42–52)
HGB BLD-MCNC: 10.4 G/DL (ref 14–18)
LEVETIRACETAM SERPL-MCNC: 21 UG/ML (ref 10–40)
MCH RBC QN AUTO: 32 PG (ref 27–33)
MCHC RBC AUTO-ENTMCNC: 32.3 G/DL (ref 32.3–36.5)
MCV RBC AUTO: 99.1 FL (ref 81.4–97.8)
PLATELET # BLD AUTO: 166 K/UL (ref 164–446)
PMV BLD AUTO: 9.7 FL (ref 9–12.9)
POTASSIUM SERPL-SCNC: 3.6 MMOL/L (ref 3.6–5.5)
PREALB SERPL-MCNC: 7 MG/DL (ref 18–38)
PROT SERPL-MCNC: 5.8 G/DL (ref 6–8.2)
RBC # BLD AUTO: 3.25 M/UL (ref 4.7–6.1)
SODIUM SERPL-SCNC: 146 MMOL/L (ref 135–145)
WBC # BLD AUTO: 9.6 K/UL (ref 4.8–10.8)

## 2025-06-20 PROCEDURE — 86140 C-REACTIVE PROTEIN: CPT

## 2025-06-20 PROCEDURE — 770020 HCHG ROOM/CARE - TELE (206)

## 2025-06-20 PROCEDURE — 84134 ASSAY OF PREALBUMIN: CPT

## 2025-06-20 PROCEDURE — A9270 NON-COVERED ITEM OR SERVICE: HCPCS | Performed by: STUDENT IN AN ORGANIZED HEALTH CARE EDUCATION/TRAINING PROGRAM

## 2025-06-20 PROCEDURE — 85027 COMPLETE CBC AUTOMATED: CPT

## 2025-06-20 PROCEDURE — 4A10X4Z MONITORING OF CENTRAL NERVOUS ELECTRICAL ACTIVITY, EXTERNAL APPROACH: ICD-10-PCS | Performed by: STUDENT IN AN ORGANIZED HEALTH CARE EDUCATION/TRAINING PROGRAM

## 2025-06-20 PROCEDURE — 87077 CULTURE AEROBIC IDENTIFY: CPT

## 2025-06-20 PROCEDURE — 95720 EEG PHY/QHP EA INCR W/VEEG: CPT | Performed by: STUDENT IN AN ORGANIZED HEALTH CARE EDUCATION/TRAINING PROGRAM

## 2025-06-20 PROCEDURE — 700105 HCHG RX REV CODE 258: Performed by: STUDENT IN AN ORGANIZED HEALTH CARE EDUCATION/TRAINING PROGRAM

## 2025-06-20 PROCEDURE — 99232 SBSQ HOSP IP/OBS MODERATE 35: CPT | Performed by: STUDENT IN AN ORGANIZED HEALTH CARE EDUCATION/TRAINING PROGRAM

## 2025-06-20 PROCEDURE — 99222 1ST HOSP IP/OBS MODERATE 55: CPT | Mod: 25

## 2025-06-20 PROCEDURE — 700111 HCHG RX REV CODE 636 W/ 250 OVERRIDE (IP): Mod: JZ | Performed by: STUDENT IN AN ORGANIZED HEALTH CARE EDUCATION/TRAINING PROGRAM

## 2025-06-20 PROCEDURE — 87205 SMEAR GRAM STAIN: CPT

## 2025-06-20 PROCEDURE — 700102 HCHG RX REV CODE 250 W/ 637 OVERRIDE(OP): Performed by: STUDENT IN AN ORGANIZED HEALTH CARE EDUCATION/TRAINING PROGRAM

## 2025-06-20 PROCEDURE — 80053 COMPREHEN METABOLIC PANEL: CPT

## 2025-06-20 PROCEDURE — 95700 EEG CONT REC W/VID EEG TECH: CPT | Performed by: STUDENT IN AN ORGANIZED HEALTH CARE EDUCATION/TRAINING PROGRAM

## 2025-06-20 PROCEDURE — 95714 VEEG EA 12-26 HR UNMNTR: CPT | Performed by: STUDENT IN AN ORGANIZED HEALTH CARE EDUCATION/TRAINING PROGRAM

## 2025-06-20 PROCEDURE — 87070 CULTURE OTHR SPECIMN AEROBIC: CPT

## 2025-06-20 RX ORDER — TAMSULOSIN HYDROCHLORIDE 0.4 MG/1
0.4 CAPSULE ORAL 2 TIMES DAILY
Status: DISCONTINUED | OUTPATIENT
Start: 2025-06-20 | End: 2025-06-23 | Stop reason: HOSPADM

## 2025-06-20 RX ORDER — AMLODIPINE BESYLATE 10 MG/1
10 TABLET ORAL DAILY
Status: DISCONTINUED | OUTPATIENT
Start: 2025-06-21 | End: 2025-06-23 | Stop reason: HOSPADM

## 2025-06-20 RX ORDER — LOSARTAN POTASSIUM 50 MG/1
100 TABLET ORAL DAILY
Status: DISCONTINUED | OUTPATIENT
Start: 2025-06-21 | End: 2025-06-23 | Stop reason: HOSPADM

## 2025-06-20 RX ORDER — AMLODIPINE BESYLATE 10 MG/1
10 TABLET ORAL DAILY
Status: DISCONTINUED | OUTPATIENT
Start: 2025-06-20 | End: 2025-06-20

## 2025-06-20 RX ORDER — OXYBUTYNIN CHLORIDE 5 MG/1
5 TABLET ORAL DAILY
Status: DISCONTINUED | OUTPATIENT
Start: 2025-06-21 | End: 2025-06-21

## 2025-06-20 RX ORDER — OXYBUTYNIN CHLORIDE 5 MG/1
5 TABLET ORAL DAILY
Status: DISCONTINUED | OUTPATIENT
Start: 2025-06-20 | End: 2025-06-20

## 2025-06-20 RX ORDER — OMEPRAZOLE 20 MG/1
20 CAPSULE, DELAYED RELEASE ORAL DAILY
Status: DISCONTINUED | OUTPATIENT
Start: 2025-06-20 | End: 2025-06-20

## 2025-06-20 RX ORDER — HYDROCHLOROTHIAZIDE 25 MG/1
25 TABLET ORAL DAILY
Status: DISCONTINUED | OUTPATIENT
Start: 2025-06-20 | End: 2025-06-23

## 2025-06-20 RX ORDER — SODIUM CHLORIDE 9 MG/ML
INJECTION, SOLUTION INTRAVENOUS CONTINUOUS
Status: DISCONTINUED | OUTPATIENT
Start: 2025-06-20 | End: 2025-06-21

## 2025-06-20 RX ORDER — ATORVASTATIN CALCIUM 20 MG/1
20 TABLET, FILM COATED ORAL DAILY
Status: DISCONTINUED | OUTPATIENT
Start: 2025-06-20 | End: 2025-06-20

## 2025-06-20 RX ORDER — ATORVASTATIN CALCIUM 20 MG/1
20 TABLET, FILM COATED ORAL DAILY
Status: DISCONTINUED | OUTPATIENT
Start: 2025-06-21 | End: 2025-06-23 | Stop reason: HOSPADM

## 2025-06-20 RX ORDER — FINASTERIDE 5 MG/1
5 TABLET, FILM COATED ORAL DAILY
Status: DISCONTINUED | OUTPATIENT
Start: 2025-06-20 | End: 2025-06-23 | Stop reason: HOSPADM

## 2025-06-20 RX ORDER — LANSOPRAZOLE 30 MG/1
30 TABLET, ORALLY DISINTEGRATING, DELAYED RELEASE ORAL DAILY
Status: DISCONTINUED | OUTPATIENT
Start: 2025-06-20 | End: 2025-06-23 | Stop reason: HOSPADM

## 2025-06-20 RX ORDER — LOSARTAN POTASSIUM 50 MG/1
100 TABLET ORAL DAILY
Status: DISCONTINUED | OUTPATIENT
Start: 2025-06-20 | End: 2025-06-20

## 2025-06-20 RX ADMIN — PIPERACILLIN AND TAZOBACTAM 4.5 G: 4; .5 INJECTION, POWDER, FOR SOLUTION INTRAVENOUS at 04:46

## 2025-06-20 RX ADMIN — AMLODIPINE BESYLATE 10 MG: 10 TABLET ORAL at 09:50

## 2025-06-20 RX ADMIN — PIPERACILLIN AND TAZOBACTAM 4.5 G: 4; .5 INJECTION, POWDER, FOR SOLUTION INTRAVENOUS at 21:35

## 2025-06-20 RX ADMIN — SODIUM CHLORIDE: 9 INJECTION, SOLUTION INTRAVENOUS at 16:38

## 2025-06-20 RX ADMIN — LANSOPRAZOLE 30 MG: 30 TABLET, ORALLY DISINTEGRATING ORAL at 12:00

## 2025-06-20 RX ADMIN — THIAMINE HYDROCHLORIDE 400 MG: 100 INJECTION, SOLUTION INTRAMUSCULAR; INTRAVENOUS at 06:16

## 2025-06-20 RX ADMIN — ATORVASTATIN CALCIUM 20 MG: 20 TABLET, FILM COATED ORAL at 09:50

## 2025-06-20 RX ADMIN — THIAMINE HYDROCHLORIDE 400 MG: 100 INJECTION, SOLUTION INTRAMUSCULAR; INTRAVENOUS at 21:40

## 2025-06-20 RX ADMIN — PIPERACILLIN AND TAZOBACTAM 4.5 G: 4; .5 INJECTION, POWDER, FOR SOLUTION INTRAVENOUS at 12:11

## 2025-06-20 RX ADMIN — DEXTROSE AND SODIUM CHLORIDE: 5; 450 INJECTION, SOLUTION INTRAVENOUS at 08:10

## 2025-06-20 RX ADMIN — THIAMINE HYDROCHLORIDE 400 MG: 100 INJECTION, SOLUTION INTRAMUSCULAR; INTRAVENOUS at 15:26

## 2025-06-20 RX ADMIN — HYDROCHLOROTHIAZIDE 25 MG: 25 TABLET ORAL at 17:51

## 2025-06-20 RX ADMIN — ENOXAPARIN SODIUM 40 MG: 100 INJECTION SUBCUTANEOUS at 16:38

## 2025-06-20 RX ADMIN — LOSARTAN POTASSIUM 100 MG: 50 TABLET, FILM COATED ORAL at 09:50

## 2025-06-20 RX ADMIN — OXYBUTYNIN CHLORIDE 5 MG: 5 TABLET ORAL at 09:50

## 2025-06-20 ASSESSMENT — PAIN DESCRIPTION - PAIN TYPE
TYPE: ACUTE PAIN
TYPE: ACUTE PAIN

## 2025-06-20 ASSESSMENT — COGNITIVE AND FUNCTIONAL STATUS - GENERAL
DRESSING REGULAR UPPER BODY CLOTHING: TOTAL
SUGGESTED CMS G CODE MODIFIER DAILY ACTIVITY: CN
SUGGESTED CMS G CODE MODIFIER MOBILITY: CN
MOBILITY SCORE: 6
EATING MEALS: TOTAL
DAILY ACTIVITIY SCORE: 6
PERSONAL GROOMING: TOTAL
HELP NEEDED FOR BATHING: TOTAL
CLIMB 3 TO 5 STEPS WITH RAILING: TOTAL
WALKING IN HOSPITAL ROOM: TOTAL
MOVING FROM LYING ON BACK TO SITTING ON SIDE OF FLAT BED: TOTAL
STANDING UP FROM CHAIR USING ARMS: TOTAL
DRESSING REGULAR LOWER BODY CLOTHING: TOTAL
TOILETING: TOTAL
MOVING TO AND FROM BED TO CHAIR: TOTAL
TURNING FROM BACK TO SIDE WHILE IN FLAT BAD: TOTAL

## 2025-06-20 NOTE — WOUND TEAM
[facility]  Inpatient Services  Initial Wound and Skin Care Evaluation    Admission Date: 6/19/2025     Last order of IP CONSULT TO WOUND CARE was found on 6/19/2025 from Hospital Encounter on 6/18/2025     HPI, PMH, SH: Reviewed    Past Surgical History[1]  Social History     Tobacco Use    Smoking status: Not on file    Smokeless tobacco: Not on file   Substance Use Topics    Alcohol use: Not on file     No chief complaint on file.    Diagnosis: Encephalopathy [G93.40]    Unit where seen by Wound Team: [id number]/02     WOUND CONSULT RELATED TO:  BL Thighs    WOUND TEAM PLAN OF CARE - Frequency of Follow-up:   Nursing to follow dressing orders written for wound care. Contact wound team if area fails to progress, deteriorates or with any questions/concerns if something comes up before next scheduled follow up (See below as to whether wound is following and frequency of wound follow up)   Not following, consult as needed  - BL Thighs    WOUND HISTORY:   Patient unable to provide wound history at this time.       WOUND ASSESSMENT/LDA  Wound 06/19/25 Atypical Thigh Proximal;Anterior Right Scattered vesicles (Active)   Date First Assessed/Time First Assessed: 06/19/25 0200   Present on Original Admission: Yes  Hand Hygiene Completed: Yes  Primary Wound Type: Atypical  Location: Thigh  Wound Orientation: Proximal;Anterior  Laterality: Right  Wound Description (Commen...      Assessments 6/19/2025  5:35 PM   Wound Image     Site Assessment Red;Yellow   Periwound Assessment Clean;Dry;Intact   Margins Defined edges;Attached edges   Closure Secondary intention   Drainage Amount None   Moisture Containment Device None   Dressing Status Clean;Dry;Intact   Dressing Changed Observed   Dressing Cleansing/Solutions Not Applicable   Dressing Options Petrolatum Gauze (yellow);Sacral Dressing - Offloading   Dressing Change/Treatment Frequency Every 48 hrs, and As Needed   NEXT Dressing Change/Treatment Date 06/21/25   NEXT  Weekly Photo (Inpatient Only) 06/26/25   Wound Team Following Not following   Non-staged Wound Description Partial thickness        Vascular:    BJORN:   No results found.    Lab Values:    Lab Results   Component Value Date/Time    WBC 10.8 06/19/2025 03:11 AM    RBC 3.21 (L) 06/19/2025 03:11 AM    HEMOGLOBIN 10.2 (L) 06/19/2025 03:11 AM    HEMATOCRIT 32.0 (L) 06/19/2025 03:11 AM    CREACTPROT 11.10 (H) 06/19/2025 04:21 AM    SEDRATEWES 133 (H) 06/19/2025 04:21 AM    PLATELETCT 172 06/19/2025 03:11 AM         Culture Results show:  No results found for this or any previous visit (from the past 720 hours).    Pain Level/Medicated:  None, Tolerated without pain medication       INTERVENTIONS BY WOUND TEAM:  Chart and images reviewed. Discussed with bedside RN. All areas of concern (based on picture review, LDA review and discussion with bedside RN) have been thoroughly assessed. Documentation of areas based on significant findings. This RN in to assess patient. Performed standard wound care which includes appropriate positioning, dressing removal and non-selective debridement. Pictures and measurements obtained weekly if/when required.    Wound:  R Anterior thigh  Preparation for Dressing removal: Removed without difficulty  Primary Dressing:  Xeroform  Secondary (Outer) Dressing: Offloading adhesive foam     Area assessed: L posterior thigh    Advanced Wound Care Discharge Planning  Number of Clinicians necessary to complete wound care: 1  Is patient requiring IV pain medications for dressing changes:  No   Length of time for dressing change 15 min. (This does not include chart review, pre-medication time, set up, clean up or time spent charting.)    Interdisciplinary consultation: Patient, Bedside RN    EVALUATION / RATIONALE FOR TREATMENT:     Date:  06/19/25  Wound Status:  Initial evaluation    R Anterior Thigh with cluster of vesicles, unknown etiology but presentation similar to herpes zoster. Xeroform (yellow)  continued to provide an occlusive dressing that incorporates bacteriostatic protection.   L Posterior Thigh with light colored linear indentation, area is slow to sarah. Expect area to resolve with continued offloading.  Sacrum intact and blanching  BL Heels intact and blanching         Goals: Steady decrease in wound area and depth weekly.    NURSING PLAN OF CARE ORDERS:  Dressing changes: See Dressing Care orders  RN Prevention Protocol    NUTRITION RECOMMENDATIONS   Wound Team Recommendations:  N/A    DIET ORDERS (From admission to next 24h)       Start     Ordered    06/19/25 1733  Diet: Diet Tube Feed; Formula: Promote; Promote: Promote Fiber RTH; Goal Rate (mL/Hour): 60; Duration: 24 HR  ALL MEALS        Comments: Start at 25mL/hr and advance per protocol to goal rate   Question Answer Comment   Diet Diet Tube Feed    Formula: Promote    Promote: Promote Fiber RTH    Goal Rate (mL/Hour) 60    Route Enteral Tube    Duration 24 HR        06/19/25 1734 06/19/25 0244  Diet NPO Restrict to: Strict  ALL MEALS        Question:  Diet NPO Restrict to:  Answer:  Strict    06/19/25 0244                    PREVENTATIVE INTERVENTIONS:    Q shift Vega - performed per nursing policy  Q shift pressure point assessments - performed per nursing policy    Surface/Positioning  Standard/trauma mattress - Currently in Place  Reposition q 2 hours with wedges - Currently in Place    Offloading/Redistribution  Heel float boots (Prevalon boot) - Currently in Place    Anticipated discharge plans:  TBD        Vac Discharge Needs:  Vac Discharge plan is purely a recommendation from wound team and not a requirement for discharge unless otherwise stated by physician.  Not Applicable Pt not on a wound vac        [1] No past surgical history on file.

## 2025-06-20 NOTE — CARE PLAN
The patient is Watcher - Medium risk of patient condition declining or worsening    Shift Goals  Clinical Goals: monitor respiratory status, hemodynamic stability, maintain skin integrity  Patient Goals: aaron  Family Goals: n/a    Progress made toward(s) clinical / shift goals:      Problem: Skin Integrity  Goal: Skin integrity is maintained or improved  Outcome: Progressing  Note: Q2h turns, NANNETTE, TAPs, heel float boots, mepliex and silicone oxygen tubing in use for prevention measures.      Problem: Respiratory:  Goal: Respiratory status will improve  Outcome: Progressing  Note: Pt remaining on oxygen. Safety net in place to monitor saturations       Patient is not progressing towards the following goals: n/a

## 2025-06-20 NOTE — EEG PROGRESS NOTE
Continuous inpatient video EEG monitoring   Preliminary review    EEG monitoring thus far shows continuous generalized predominantly delta slowing of the background with superimposed faster frequency waveforms. This suggests nonspecific diffuse cerebral dysfunction.     No seizures or epileptiform discharges are observed.     Detailed report to follow in AM.     Kate Jewell M.D.   Epileptologist/Neurologist

## 2025-06-20 NOTE — EEG PROGRESS NOTE
Patient being transferred to [facility] to accommodate 24hr EEG hookup. Will return once patient is moved and ready.

## 2025-06-20 NOTE — PROCEDURES
Formerly [facility], [facility]–1983    Continuous Video Electroencephalogram Report          Patient Name: Kishor Nash  MRN: [id number]  #: [id number]  Date of Service: 2:59 PM on 6/19/2025 to 07:00 AM on 06/20/25  Total Recording Time: 19 hours and 29 minutes.  Referring Provider: Barry Whyte M.D.    INDICATION:  Kishor Nash 84 y.o. male presenting with altered mental status    CURRENT ANTI-SEIZURE AND OTHER PERTINENT MEDICATIONS:   Scheduled Medications[1]    TECHNIQUE: CVEEG was set up by a Neurodiagnostic technologist who performed education to the patient and staff. A minimum of 23 electrodes and 23 channel recording was setup and performed by Neurodiagnostic technologist, in accordance with the international 10-20 system. Impedence, electrode integrity, and technical impressions were documented a minimum of every 2-24 hour period by a Neurodiagnostic Technologist and reviewed by Interpreting Physician. The study was reviewed in bipolar and referential montages. The recording examined the patient in the awake, drowsy, and sleep state(s).     DESCRIPTION OF THE RECORD:  EEG background: During maximal wakefulness, the background was continuous, symmetrical, and poorly defined and/or fragmented 7 Hz posterior dominant rhythm.  Reactivity and state changes were present.  During drowsiness, a loss of myogenic artifact and theta/delta frequencies were seen.     Sleep was captured and was characterized by diffuse background delta/theta activity with a loss of myogenic artifact.  N2 sleep transients in the form of sleep spindles and vertex waves were seen in the leads over the central regions.     ACTIVATION PROCEDURES:   Intermittent Photic stimulation was performed in a stepwise fashion from 1 to 30 Hz and did not elicited any abnormalities on EEG.     ICTAL AND INTERICTAL FINDINGS:   No focal or generalized epileptiform activity noted.     No persistent regional slowing was seen during this routine study.      No electroclinical or  electrographic seizures were reported or recorded during the study.     EKG: Sampling of the EKG recording showed sinus rhythm    Artifacts: Fp1 lead artifact present after 9:07 PM.    EVENTS:  No clinical events recorded or reported    INTERPRETATION:  Abnormal - inpatient continuous video EEG recording in the awake, drowsy, and sleep state(s):  - Mild continuous generalized slowing of the background suggestive of mild nonspecific diffuse cerebral dysfunction.  - Epileptiform discharges: No epileptiform discharges were seen.   - No seizures.       Kate Jewell M.D.  Epileptologist/Neurologist  [facility]           [1]   Scheduled Medications   Medication Dose Frequency    [Held by provider] finasteride  5 mg DAILY    [Held by provider] tamsulosin  0.4 mg BID    lansoprazole  30 mg DAILY    [START ON 6/21/2025] amLODIPine  10 mg DAILY    [START ON 6/21/2025] atorvastatin  20 mg DAILY    [START ON 6/21/2025] losartan  100 mg DAILY    [START ON 6/21/2025] oxybutynin  5 mg DAILY    enoxaparin (LOVENOX) injection  40 mg DAILY AT 1800    thiamine  400 mg Q8HRS    piperacillin-tazobactam  4.5 g Q8HRS    Pharmacy  1 Each PHARMACY TO DOSE    senna-docusate  2 Tablet Q EVENING    scopolamine  1 Patch Q72HRS

## 2025-06-20 NOTE — DIETARY
Nutrition Services: Initial Assessment     Day 0 of admit. Kishor Nash is a 84 y.o. male with admitting DX of Encephalopathy.     Consult received for TF.     Current Hospital Problems List:    Encephalopathy  Macrocytic anemia  Hyponatremia  Acute hypoxic respiratory failure       Nutrition Assessment:      Weight: 83.5 kg (184 lb 1.4 oz)  Weight taken via bed scale  There is no height or weight on file to calculate BMI. BMI classification: unknown at this time, pending height per RN.  Wt Readings from Last 6 Encounters:   06/19/25 83.5 kg (184 lb 1.4 oz)        Calculation Equation: REE per MSJ x 1.0 = 1401 kcals   Calories / day: 1400 - 1700 (17 - 20 kcals/kg)  Grams Protein / day: 84 - 100 (1.0 - 1.2 gm/kg)    Objective:   Pertinent Medical Hx: BPH, HLD, HTN, depression alcohol use disorder.   Indication for Nutrition Support: Unable for SLP to do eval today r/t not arousable.   Enteral Access: Pending IRIS feeding tube placement per discussion with RN. Md notes pt's spouse is agreeable to feeding tube.    Pertinent Labs: Na 149, glucose 124, BUN 27, total protein 5.7  Pertinent Meds: senna, thiamine, anti-emetics prn and bowel meds prn, D5 1/2 NS   Skin/Wounds: Multiple wounds, wound team consult is pending.   Food Allergies: Not on file.  Last BM: 6/19 Type 7  Formula based on RD Eval:     Current Diet Order/Intake:   NPO, TF ordered    Subjective:   Pt is nonverbal, discussed with RN, appears well nourished and overweight to obese.    Nutrition Focused Physical Exam (NFPE)  Weight Loss: No weight hx on file.   Muscle Mass: Well nourished per RN  Subcutaneous Fat: Well nourished per RN  Fluid Accumulation:   Reduced  Strength: N/A in acute care setting.    Nutrition Diagnosis:      Inadequate oral intake r/t lethargy as evidenced by need for nutrition support.    Pt does not meet ASPEN malnutrition criteria at this time.    Nutrition Interventions:      Initiate TF Promote with fiber at 25 ml/hr and  advance per protocol to goal rate of 60 ml/hr to provide 1440 kcals, 91 gm protein, and 1197 ml free water per day.  Diet upgrades per SLP.  Fluids per MD.   Patient aware of active plan of care as appropriate.       Nutrition Monitoring and Evaluation:      Monitor for TF advancement to goal rate and tolerance.  Monitor nutrition POC.   Additional fluids per MD/DO  Monitor vital signs pertinent to nutrition.    RD following and will provide updated recommendations as indicated.

## 2025-06-20 NOTE — CONSULTS
"Neurology Initial Consult H&P  Neurohospitalist Service, [facility]    Referring Physician: Barry Whyte M.D.    No chief complaint on file.      HPI: Kishor Nash is a 84-year-old male transferred from [facility] for further evaluation of encephalopathy and EEG analysis. Neurology has been consulted to assist with ongoing diagnostic workup. The patient was initially admitted to the outside facility for alcohol withdrawal seizures and altered mental status. An extensive workup at the referring hospital--including non-contrast head CT, CTA head and neck, MRI brain without contrast, and lumbar puncture--has been unrevealing.    Given persistent encephalopathy of unclear etiology, further neurological assessment and EEG monitoring have been requested.    Review of systems: In addition to what is detailed in the HPI above, all other systems reviewed and are negative.    Past Medical History:    has no past medical history on file.    FHx:  family history is not on file.    SHx:       Allergies:  Allergies[1]    Medications:  Current Medications[2]    Physical Examination:   BP (!) 187/115   Pulse 87   Temp 36.2 °C (97.2 °F) (Temporal)   Resp 20   Ht 1.575 m (5' 2\")   Wt 83.5 kg (184 lb 1.4 oz)   SpO2 96%   BMI 33.67 kg/m²       General: Somnolent on exam, grimaces and wakens to painful stimuli.   Neck: Unable to assess   CV: Regular rate   Extremities:  Warm, dry, and intact, without peripheral lower extremity edema    NEUROLOGICAL EXAM:     Mental status: Wakens to sternal rub, fatigues quickly, does not engage in conversation.   Speech and language: Unable to assess   Cranial nerve exam:    CN II-III: PERRLA   CN II: Unable to assess    CN III, IV, VI: Unable to assess   CN V: Unable to assess   CN VII: Unable to assess   CN VIII: Hearing grossly normal   CN IX, X: Unable to assess   CN XI: Unable to assess   CN XII: Tongue midline    Motor exam: Unable to assess, not " "following commands or engaging in conversation, somnolent.        Upper Extremity  Myotome R L   Shoulder flexion C5 2/5 2/5   Elbow flexion C5 2/5 2/5   Wrist extension C6 2/5 2/5   Elbow extension C7 2/5 2/5   Finger flexion C8 2/5 2/5   Finger abduction T1 2/5 2/5     Lower Extremity Myotome R L   Hip flexion L2 2/5 2/5   Knee extension L3 2/5 2/5   Ankle dorsiflexion L4 2/5 2/5   Ankle plantarflexion S1 2/5 2/5          Reflexes:   Brachioradialus  R 2/ L 2   Biceps  R 2/ L 2   Triceps  R 2/ L 2   Patellar R 2/ L 2   Achilles R 2/ L 2   Toes down going bilaterally, No clonus       Sensory exam:  Unable to assess  Coordination: Unable to assess        Objective Data:    Labs:  No results found for: \"PROTHROMBTM\", \"INR\"   Lab Results   Component Value Date/Time    WBC 9.6 06/20/2025 03:01 AM    RBC 3.25 (L) 06/20/2025 03:01 AM    HEMOGLOBIN 10.4 (L) 06/20/2025 03:01 AM    HEMATOCRIT 32.2 (L) 06/20/2025 03:01 AM    MCV 99.1 (H) 06/20/2025 03:01 AM    MCH 32.0 06/20/2025 03:01 AM    MCHC 32.3 06/20/2025 03:01 AM    MPV 9.7 06/20/2025 03:01 AM    NEUTSPOLYS 79.30 (H) 06/19/2025 03:11 AM    LYMPHOCYTES 8.60 (L) 06/19/2025 03:11 AM    MONOCYTES 9.60 06/19/2025 03:11 AM    EOSINOPHILS 1.20 06/19/2025 03:11 AM    BASOPHILS 0.60 06/19/2025 03:11 AM      Lab Results   Component Value Date/Time    SODIUM 146 (H) 06/20/2025 03:01 AM    POTASSIUM 3.6 06/20/2025 03:01 AM    CHLORIDE 117 (H) 06/20/2025 03:01 AM    CO2 21 06/20/2025 03:01 AM    GLUCOSE 148 (H) 06/20/2025 03:01 AM    BUN 22 06/20/2025 03:01 AM    CREATININE 0.83 06/20/2025 03:01 AM      No results found for: \"CHOLSTRLTOT\", \"LDL\", \"HDL\", \"TRIGLYCERIDE\"    Lab Results   Component Value Date/Time    ALKPHOSPHAT 60 06/20/2025 03:01 AM    ASTSGOT 25 06/20/2025 03:01 AM    ALTSGPT 12 06/20/2025 03:01 AM    TBILIRUBIN 0.4 06/20/2025 03:01 AM        Imaging/Testing:    I interpreted and/or reviewed the patient's neuroimaging    DX-ABDOMEN FOR TUBE PLACEMENT   Final " Result      Gastric tube terminates in the proximal stomach.      DX-CHEST-PORTABLE (1 VIEW)   Final Result         1.  No acute cardiopulmonary disease.   2.  Cardiomegaly   3.  Atherosclerosis      IR-US GUIDED PIV    (Results Pending)     EEG INTERPRETATION:   Abnormal video EEG recording in the awake, drowsy, and sleep state(s):  - Mild continuous generalized slowing of the background.   - Scattered triphasic waves were occasionally present.  - No regional slowing or persistent focal asymmetries were seen.  - No epileptiform discharges were seen.  - No seizures    Impression and Recommendations: Kishor Nash is a 84-year-old male for whom Neurology has been consulted for evaluation of persistent encephalopathy. The patient was transferred from [facility] following an admission for alcohol withdrawal seizures and ongoing altered mental status of unclear etiology.    He was hospitalized at the outside facility for approximately 5 days with minimal improvement in mental status. Extensive workup was performed, including CT imaging and MRI brain without contrast, which revealed a chronic lacunar infarct in the basal ganglia. A lumbar puncture was also completed, with CSF analysis described as unremarkable. Keppra was initiated due to concern for subclinical seizure activity contributing to the encephalopathy. Additionally, empiric antibiotics were started for possible underlying infectious etiology. EEG obtained shows diffuse slowing of the background, no epileptiform activity and no seizure events captured.     Encephalopathy is currently presumed to be secondary to a toxic/metabolic etiology. Workup to date reveals low suspicion for a primary neurological cause. However, given the persistence of altered mental status with unclear etiology, it would be reasonable to repeat portions of the diagnostic evaluation--specifically, a lumbar puncture for repeat CSF analysis, a 24-hour EEG, and an MRI  brain with and without contrast.    Keppra will be discontinued at this time, as it was initiated as a therapeutic trial without clear clinical benefit and may be contributing to the patient’s somnolence.      Plan:   - MRI brain with and without contrast   - Lumbar puncture for CSF analysis   - cell count, glucose, protein, culture, meningitis/encephalitis panel   - EEG 24 hour   - D/ C Keppra   - Neuro checks q. 4 hours   - PT/OT when able   - Toxic/metabolic work up per primary team   - Neurology will follow along peripherally to review pending repeat work up           ERIC Wright  Acute Neurology      The evaluation of the patient, and recommended management, was discussed with Dr. Hudson, Neurohospitalist       Please note that this dictation was created using voice recognition software.  I have made every reasonable attempt to correct obvious errors, but I expect that there are errors of grammar and possibly content that I did not discover before finalizing the note.         [1] Not on File  [2]   Current Facility-Administered Medications:     amLODIPine (Norvasc) tablet 10 mg, 10 mg, Oral, DAILY, Barry Whyte M.D., 10 mg at 06/20/25 0950    atorvastatin (Lipitor) tablet 20 mg, 20 mg, Oral, DAILY, Barry Whyte M.D., 20 mg at 06/20/25 0950    [Held by provider] finasteride (Proscar) tablet 5 mg, 5 mg, Oral, DAILY, Barry Whyte M.D.    [Held by provider] tamsulosin (Flomax) capsule 0.4 mg, 0.4 mg, Oral, BID, Barry Whyte M.D.    oxybutynin (Ditropan) tablet 5 mg, 5 mg, Oral, DAILY, Barry Whyte M.D., 5 mg at 06/20/25 0950    losartan (Cozaar) tablet 100 mg, 100 mg, Oral, DAILY, Barry Whyte M.D., 100 mg at 06/20/25 0950    lansoprazole (Prevacid) solutab 30 mg, 30 mg, Enteral Tube, DAILY, Barry Whyte M.D.    Respiratory Therapy Consult, , Nebulization, Continuous RT, Russell Lawrence M.D.    enoxaparin (Lovenox) inj 40 mg, 40 mg, Subcutaneous, DAILY AT 1800, Russell Lawrence M.D., 40 mg  at 06/19/25 1758    hydrALAZINE (Apresoline) injection 10 mg, 10 mg, Intravenous, Q4HRS PRN, Russell Lawrence M.D., 10 mg at 06/19/25 2337    diazePAM (Valium) injection 2.5 mg, 2.5 mg, Intravenous, Q5 MIN PRN, Russell Lawrence M.D.    thiamine (B-1) 400 mg in dextrose 5% 100 mL IVPB, 400 mg, Intravenous, Q8HRS, Russell Lawrence M.D., Last Rate: 200 mL/hr at 06/20/25 0616, 400 mg at 06/20/25 0616    [COMPLETED] piperacillin-tazobactam (Zosyn) 4.5 g in  mL IVPB, 4.5 g, Intravenous, Once, Stopped at 06/19/25 0441 **AND** piperacillin-tazobactam (Zosyn) 4.5 g in  mL IVPB, 4.5 g, Intravenous, Q8HRS, Russell Lawrence M.D., Stopped at 06/20/25 0846    D5 1/2 NS infusion, , Intravenous, Continuous, Barry Whyte M.D., Last Rate: 125 mL/hr at 06/20/25 0810, New Bag at 06/20/25 0810    Pharmacy Consult: Enteral tube insertion - review meds/change route/product selection, 1 Each, Other, PHARMACY TO DOSE, Barry Whyte M.D.    acetaminophen (Tylenol) tablet 650 mg, 650 mg, Enteral Tube, Q6HRS PRN, Barry Whyte M.D.    ondansetron (Zofran) syringe/vial injection 4 mg, 4 mg, Intravenous, Q4HRS PRN **OR** ondansetron (Zofran ODT) dispertab 4 mg, 4 mg, Enteral Tube, Q4HRS PRN, Barry Whyte M.D.    senna-docusate (Pericolace Or Senokot S) 8.6-50 MG per tablet 2 Tablet, 2 Tablet, Enteral Tube, Q EVENING **AND** polyethylene glycol/lytes (Miralax) Packet 1 Packet, 1 Packet, Enteral Tube, QDAY PRN, Barry Whyte M.D.    scopolamine (Transderm-Scop) patch 1 Patch, 1 Patch, Transdermal, Q72HRS, Barry Whyte M.D., 1 Patch at 06/19/25 1758

## 2025-06-20 NOTE — THERAPY
Speech Language Therapy Contact Note    Patient Name: Kishor Nash  Age:  84 y.o., Sex:  male  Medical Record #: [id number]  Today's Date: 6/20/2025 06/20/25 0910   Treatment Variance   Reason For Missed Therapy Medical - Patient Unarousable   Initial Contact Note    Initial Contact Note  Order Received and Verified, Speech Therapy Evaluation in Progress with Full Report to Follow.   Interdisciplinary Plan of Care Collaboration   IDT Collaboration with  Nursing   Collaboration Comments RN recommended to continue to hold CSE as patient was lethargic this morning. SLP to hold and re-attempt as medically appropriate. Pt now has NGT per EMR.     - Ailyn Alvarez, SLP

## 2025-06-20 NOTE — EEG PROGRESS NOTE
Continuous video EEG was hooked with CT compatible and MRI Conditional leads. These can go to both CT and MRI when disconnected properly.

## 2025-06-20 NOTE — PROGRESS NOTES
Hospital Medicine Daily Progress Note    Date of Service  6/20/2025    Chief Complaint  Kishor Nash is a 84 y.o. male admitted 6/19/2025 with encephalopathy.    Hospital Course    Patient is an 84-year-old male with past medical history of BPH, HLD, HTN, depression, alcohol use disorder that presented to [facility] on 6/13/2025 with acute encephalopathy.  Admitted with mild hypokalemia, mild KAYLIE, and mild leukocytosis.  CT head showing remote left basal ganglia lacunar infarct.  CTA head and neck without acute abnormality.  Patient was started on IV fluids and high-dose IV thiamine, as well as CIWA protocol.  Teleneurology was consulted recommending LP and continuous EEG to rule out nonconvulsive status epilepticus.  Received Keppra 2 g IV x 1 load on 6/17/2025.  Patient was subsequently started on broad-spectrum antibiotics for concern of underlying infection.  TTE on 6/15/2025 without vegetation or thrombus, or any valvular or regional wall abnormality.  Blood culture 6/13 and 6/16 no growth to date.  Patient was started on IV ceftriaxone 6/14/2025 to 6/16/2025 for possible UTI given bilateral perinephric stranding seen on CT abdomen pelvis, but without improvement was brought into IV vancomycin and IV Zosyn.  Patient additionally underwent LP with bland fluid analysis, WBC 1, RBC 1, protein 72, glucose 73, total of 14 cells were seen, of which 13 were monocytes and 1 more PMN.  Patient also developed acute hypoxic respiratory failure prior to transfer with chest x-ray at outside facility showing small lung volumes, otherwise unremarkable.     Patient was transferred to [facility] for higher level of care, including EEG and neurology consultation.     At outside hospital, CT head without contrast 6/13/2025 without acute abnormality. CT head without contrast 6/18/2025 without acute abnormality.  MRI brain without contrast 6/13/2025 without acute abnormality, old left basilar ganglia lacunar infarct.  CTA  head and neck 6/13/2025 without acute abnormality.  CT abdomen pelvis without contrast 6/14/2025 with bilateral perinephric soft tissue stranding, diverticulosis of the sigmoid colon, small fat-containing left inguinal hernia. LP on 6/17/2025 with CSF clearing colorless, 1 WBC, 1 RBC, 72 protein (high), 73 glucose (high). COVID/flu/RSV negative. Syphilis nonreactive. HIV nonreactive. UDS negative, alcohol negative, salicylate negative, Tylenol negative. UA 6/13/2025 with 30 protein, negative nitrite, negative leukocyte esterase, WBC 0-2, RBC 16-25, rare bacteria. TSH 1.499, B12 1159, folate 32.5.     On admission, WBC 10.8, hemoglobin 10.2, , sodium 149, potassium 3.7, bicarb 20, anion gap 12, BUN 27, creatinine 0.95, corrected calcium 9.2, CPK 94, ammonia 24, lactic acid 1.1, B12 1164, TSH 1.17, CRP 11.1, prolactin 14.5, .  Chest x-ray without acute abnormality.    Interval Problem Update  No acute events overnight.  Patient remains lethargic, not able to speak with me, quickly falls back asleep when aroused.  Tolerating NG tube and tube feeding well thus far, continue.  Continuous EEG ordered, neurology consulted. Appreciate their recommendations.  Will consider repeat MRI tomorrow if no symptom improvement.  Records from [facility] reviewed, notably UA on presentation negative for infection. CT without signs of cirrhosis, echo and blood cultures negative, MRI brain with diffuse atrophy, old left basal ganglia stroke but no acute findings.  Blood pressure remains elevated, continue home medications and HTN prn's. Maybe missed PRES on initial MRI brain? Or possibly autoimmune encephalitis picture given elevated inflammatory markers? Appreciate neurology recommendations.  Will continue tube feeding and IV fluids for now given lack of safe PO intake.    I have discussed this patient's plan of care and discharge plan at IDT rounds today with Case Management, Nursing, Nursing leadership, and other  members of the IDT team.    Consultants/Specialty  neurology    Code Status  Full Code    Disposition  The patient is not medically cleared for discharge to home or a post-acute facility.      I have placed the appropriate orders for post-discharge needs.    Review of Systems  Review of Systems   Unable to perform ROS: Acuity of condition        Physical Exam  Temp:  [36.2 °C (97.2 °F)-36.6 °C (97.9 °F)] 36.2 °C (97.2 °F)  Pulse:  [79-91] 87  Resp:  [18-20] 18  BP: (143-187)/() 166/66  SpO2:  [94 %-96 %] 94 %    Physical Exam  Constitutional:       General: He is not in acute distress.     Appearance: He is obese. He is not ill-appearing.   HENT:      Head: Normocephalic and atraumatic.      Right Ear: External ear normal.      Left Ear: External ear normal.      Mouth/Throat:      Pharynx: No oropharyngeal exudate or posterior oropharyngeal erythema.   Eyes:      Extraocular Movements: Extraocular movements intact.      Pupils: Pupils are equal, round, and reactive to light.   Cardiovascular:      Rate and Rhythm: Normal rate and regular rhythm.      Pulses: Normal pulses.      Heart sounds: Normal heart sounds.   Pulmonary:      Effort: Pulmonary effort is normal. No respiratory distress.      Breath sounds: Rhonchi present. No wheezing or rales.   Abdominal:      General: Bowel sounds are normal. There is no distension.      Palpations: Abdomen is soft.      Tenderness: There is no abdominal tenderness. There is no guarding.   Musculoskeletal:         General: Swelling present. No tenderness.      Cervical back: Normal range of motion and neck supple.   Skin:     General: Skin is warm and dry.         Fluids    Intake/Output Summary (Last 24 hours) at 6/20/2025 1204  Last data filed at 6/20/2025 0750  Gross per 24 hour   Intake 30 ml   Output 1300 ml   Net -1270 ml        Laboratory  Recent Labs     06/19/25  0311 06/20/25  0301   WBC 10.8 9.6   RBC 3.21* 3.25*   HEMOGLOBIN 10.2* 10.4*   HEMATOCRIT 32.0*  32.2*   MCV 99.7* 99.1*   MCH 31.8 32.0   MCHC 31.9* 32.3   RDW 48.9 47.0   PLATELETCT 172 166   MPV 9.2 9.7     Recent Labs     06/19/25  0311 06/20/25  0301   SODIUM 149* 146*   POTASSIUM 3.7 3.6   CHLORIDE 117* 117*   CO2 20 21   GLUCOSE 124* 148*   BUN 27* 22   CREATININE 0.95 0.83   CALCIUM 8.3* 8.2*                   Imaging  DX-ABDOMEN FOR TUBE PLACEMENT   Final Result      Gastric tube terminates in the proximal stomach.      DX-CHEST-PORTABLE (1 VIEW)   Final Result         1.  No acute cardiopulmonary disease.   2.  Cardiomegaly   3.  Atherosclerosis      IR-US GUIDED PIV    (Results Pending)        Assessment/Plan  * Encephalopathy- (present on admission)  Assessment & Plan  Please refer to HPI in H&P for hospital course at [facility]. Effectively extensive negative workup with transfer to [facility] for EEG and neurology consultation for possible seizure.  -Seizure precautions  -Telemetry  Continuous EEG ordered, neurology consulted  Hold keppra for now  Continue thiamine  Continue empiric antibiotics zosyn for now, although no source of infection seen, and patient has been on antibiotics since 6/14 without improvement    Acute hypoxic respiratory failure (HCC)- (present on admission)  Assessment & Plan  Patient requiring 4 L nasal cannula on admission.    -Order CXR, full respiratory panel, VBG, EKG, troponin, NT proBNP, D-dimer  -RT protocol  -Supplemental O2 as needed  -Wean oxygen as tolerated  -Incentive spirometry    Hyponatremia- (present on admission)  Assessment & Plan  Due to hypovolemia, no PO intake during [facility] hospitalization  NG tube, start tube feeding  Start IV fluids, monitor    Macrocytic anemia- (present on admission)  Assessment & Plan  Hemoglobin 10.2 and MCV 99.7.  No sign of acute bleed.    - Daily CBC  - Order iron panel, ferritin, reticulocyte count, TSH/FT4, B12, folate         VTE prophylaxis: VTE Selection    I have performed a physical exam and reviewed and updated ROS and  Plan today (6/20/2025). In review of yesterday's note (6/19/2025), there are no changes except as documented above.

## 2025-06-20 NOTE — CARE PLAN
The patient is Watcher - Medium risk of patient condition declining or worsening    Shift Goals  Clinical Goals: Stable neuro status, maintain skin integrity  Patient Goals: AMELIA  Family Goals: Updates    Progress made toward(s) clinical / shift goals:    Problem: Skin Integrity  Goal: Skin integrity is maintained or improved  Description: Target End Date:  Prior to discharge or change in level of care    Document interventions on Skin Risk/Vega flowsheet groups and corresponding LDA    1.  Assess and monitor skin integrity, appearance and/or temperature  2.  Assess risk factors for impaired skin integrity and/or pressures ulcers  3.  Implement precautions to protect skin integrity in collaboration with interdisciplinary team  4.  Implement pressure ulcer prevention protocol if at risk for skin breakdown  5.  Confirm wound care consult if at risk for skin breakdown  6.  Ensure patient use of pressure relieving devices  (Low air loss bed, waffle overlay, heel protectors, ROHO cushion, etc)  Outcome: Progressing     Problem: Urinary Elimination:  Goal: Ability to reestablish a normal urinary elimination pattern will improve  Outcome: Progressing     Problem: Fall Risk  Goal: Patient will remain free from falls  Description: Target End Date:  Prior to discharge or change in level of care    Document interventions on the Mcdaniel Stu Fall Risk Assessment    1.  Assess for fall risk factors  2.  Implement fall precautions  Outcome: Progressing       Patient is not progressing towards the following goals:

## 2025-06-21 ENCOUNTER — APPOINTMENT (OUTPATIENT)
Dept: RADIOLOGY | Facility: MEDICAL CENTER | Age: 85
DRG: 070 | End: 2025-06-21
Attending: INTERNAL MEDICINE
Payer: MEDICARE

## 2025-06-21 ENCOUNTER — APPOINTMENT (OUTPATIENT)
Dept: RADIOLOGY | Facility: MEDICAL CENTER | Age: 85
DRG: 070 | End: 2025-06-21
Payer: MEDICARE

## 2025-06-21 LAB
ALBUMIN SERPL BCP-MCNC: 3 G/DL (ref 3.2–4.9)
ALBUMIN/GLOB SERPL: 0.9 G/DL
ALP SERPL-CCNC: 58 U/L (ref 30–99)
ALT SERPL-CCNC: 13 U/L (ref 2–50)
ANION GAP SERPL CALC-SCNC: 10 MMOL/L (ref 7–16)
AST SERPL-CCNC: 28 U/L (ref 12–45)
BILIRUB SERPL-MCNC: 0.5 MG/DL (ref 0.1–1.5)
BUN SERPL-MCNC: 16 MG/DL (ref 8–22)
C DIFF TOX GENS STL QL NAA+PROBE: NEGATIVE
CALCIUM ALBUM COR SERPL-MCNC: 9.3 MG/DL (ref 8.5–10.5)
CALCIUM SERPL-MCNC: 8.5 MG/DL (ref 8.5–10.5)
CHLORIDE SERPL-SCNC: 107 MMOL/L (ref 96–112)
CO2 SERPL-SCNC: 27 MMOL/L (ref 20–33)
CREAT SERPL-MCNC: 0.87 MG/DL (ref 0.5–1.4)
ERYTHROCYTE [DISTWIDTH] IN BLOOD BY AUTOMATED COUNT: 45.9 FL (ref 35.9–50)
GFR SERPLBLD CREATININE-BSD FMLA CKD-EPI: 84 ML/MIN/1.73 M 2
GLOBULIN SER CALC-MCNC: 3.2 G/DL (ref 1.9–3.5)
GLUCOSE SERPL-MCNC: 122 MG/DL (ref 65–99)
GRAM STN SPEC: NORMAL
HCT VFR BLD AUTO: 30.4 % (ref 42–52)
HGB BLD-MCNC: 10.1 G/DL (ref 14–18)
MAGNESIUM SERPL-MCNC: 2 MG/DL (ref 1.5–2.5)
MCH RBC QN AUTO: 32.3 PG (ref 27–33)
MCHC RBC AUTO-ENTMCNC: 33.2 G/DL (ref 32.3–36.5)
MCV RBC AUTO: 97.1 FL (ref 81.4–97.8)
PHOSPHATE SERPL-MCNC: 2.5 MG/DL (ref 2.5–4.5)
PLATELET # BLD AUTO: 216 K/UL (ref 164–446)
PMV BLD AUTO: 9.3 FL (ref 9–12.9)
POTASSIUM SERPL-SCNC: 3.2 MMOL/L (ref 3.6–5.5)
PROCALCITONIN SERPL-MCNC: 0.12 NG/ML
PROT SERPL-MCNC: 6.2 G/DL (ref 6–8.2)
RBC # BLD AUTO: 3.13 M/UL (ref 4.7–6.1)
SIGNIFICANT IND 70042: NORMAL
SITE SITE: NORMAL
SODIUM SERPL-SCNC: 144 MMOL/L (ref 135–145)
SOURCE SOURCE: NORMAL
WBC # BLD AUTO: 9.3 K/UL (ref 4.8–10.8)

## 2025-06-21 PROCEDURE — 84145 PROCALCITONIN (PCT): CPT

## 2025-06-21 PROCEDURE — 80053 COMPREHEN METABOLIC PANEL: CPT

## 2025-06-21 PROCEDURE — 94640 AIRWAY INHALATION TREATMENT: CPT

## 2025-06-21 PROCEDURE — 700111 HCHG RX REV CODE 636 W/ 250 OVERRIDE (IP): Performed by: STUDENT IN AN ORGANIZED HEALTH CARE EDUCATION/TRAINING PROGRAM

## 2025-06-21 PROCEDURE — 70553 MRI BRAIN STEM W/O & W/DYE: CPT

## 2025-06-21 PROCEDURE — 700102 HCHG RX REV CODE 250 W/ 637 OVERRIDE(OP): Performed by: INTERNAL MEDICINE

## 2025-06-21 PROCEDURE — 770022 HCHG ROOM/CARE - ICU (200)

## 2025-06-21 PROCEDURE — A9270 NON-COVERED ITEM OR SERVICE: HCPCS | Performed by: INTERNAL MEDICINE

## 2025-06-21 PROCEDURE — 83735 ASSAY OF MAGNESIUM: CPT

## 2025-06-21 PROCEDURE — 700102 HCHG RX REV CODE 250 W/ 637 OVERRIDE(OP): Performed by: STUDENT IN AN ORGANIZED HEALTH CARE EDUCATION/TRAINING PROGRAM

## 2025-06-21 PROCEDURE — 85027 COMPLETE CBC AUTOMATED: CPT

## 2025-06-21 PROCEDURE — 87493 C DIFF AMPLIFIED PROBE: CPT

## 2025-06-21 PROCEDURE — 700105 HCHG RX REV CODE 258: Performed by: STUDENT IN AN ORGANIZED HEALTH CARE EDUCATION/TRAINING PROGRAM

## 2025-06-21 PROCEDURE — A9579 GAD-BASE MR CONTRAST NOS,1ML: HCPCS | Mod: JZ

## 2025-06-21 PROCEDURE — 99232 SBSQ HOSP IP/OBS MODERATE 35: CPT

## 2025-06-21 PROCEDURE — 700105 HCHG RX REV CODE 258: Performed by: INTERNAL MEDICINE

## 2025-06-21 PROCEDURE — 700117 HCHG RX CONTRAST REV CODE 255: Mod: JZ

## 2025-06-21 PROCEDURE — 700111 HCHG RX REV CODE 636 W/ 250 OVERRIDE (IP): Mod: JZ

## 2025-06-21 PROCEDURE — 700101 HCHG RX REV CODE 250: Performed by: INTERNAL MEDICINE

## 2025-06-21 PROCEDURE — 84100 ASSAY OF PHOSPHORUS: CPT

## 2025-06-21 PROCEDURE — A9270 NON-COVERED ITEM OR SERVICE: HCPCS | Performed by: STUDENT IN AN ORGANIZED HEALTH CARE EDUCATION/TRAINING PROGRAM

## 2025-06-21 PROCEDURE — 700111 HCHG RX REV CODE 636 W/ 250 OVERRIDE (IP): Performed by: INTERNAL MEDICINE

## 2025-06-21 PROCEDURE — 71045 X-RAY EXAM CHEST 1 VIEW: CPT

## 2025-06-21 RX ORDER — GADOTERIDOL 279.3 MG/ML
17 INJECTION INTRAVENOUS ONCE
Status: COMPLETED | OUTPATIENT
Start: 2025-06-21 | End: 2025-06-21

## 2025-06-21 RX ORDER — ALBUTEROL SULFATE 5 MG/ML
2.5 SOLUTION RESPIRATORY (INHALATION)
Status: DISCONTINUED | OUTPATIENT
Start: 2025-06-21 | End: 2025-06-23 | Stop reason: HOSPADM

## 2025-06-21 RX ORDER — ACETYLCYSTEINE 200 MG/ML
3 SOLUTION ORAL; RESPIRATORY (INHALATION)
Status: DISCONTINUED | OUTPATIENT
Start: 2025-06-21 | End: 2025-06-21

## 2025-06-21 RX ORDER — IPRATROPIUM BROMIDE AND ALBUTEROL SULFATE 2.5; .5 MG/3ML; MG/3ML
3 SOLUTION RESPIRATORY (INHALATION)
Status: DISCONTINUED | OUTPATIENT
Start: 2025-06-21 | End: 2025-06-22

## 2025-06-21 RX ORDER — ALBUTEROL SULFATE 5 MG/ML
2.5 SOLUTION RESPIRATORY (INHALATION)
Status: DISCONTINUED | OUTPATIENT
Start: 2025-06-21 | End: 2025-06-21

## 2025-06-21 RX ORDER — SODIUM CHLORIDE FOR INHALATION 3 %
3 VIAL, NEBULIZER (ML) INHALATION
Status: DISCONTINUED | OUTPATIENT
Start: 2025-06-21 | End: 2025-06-22

## 2025-06-21 RX ORDER — POTASSIUM CHLORIDE 7.45 MG/ML
10 INJECTION INTRAVENOUS
Status: COMPLETED | OUTPATIENT
Start: 2025-06-21 | End: 2025-06-21

## 2025-06-21 RX ORDER — POTASSIUM CHLORIDE 1500 MG/1
40 TABLET, EXTENDED RELEASE ORAL ONCE
Status: COMPLETED | OUTPATIENT
Start: 2025-06-21 | End: 2025-06-21

## 2025-06-21 RX ORDER — POTASSIUM CHLORIDE 1500 MG/1
40 TABLET, EXTENDED RELEASE ORAL ONCE
Status: DISCONTINUED | OUTPATIENT
Start: 2025-06-21 | End: 2025-06-21

## 2025-06-21 RX ORDER — FUROSEMIDE 10 MG/ML
40 INJECTION INTRAMUSCULAR; INTRAVENOUS ONCE
Status: COMPLETED | OUTPATIENT
Start: 2025-06-21 | End: 2025-06-21

## 2025-06-21 RX ORDER — SODIUM CHLORIDE, SODIUM LACTATE, POTASSIUM CHLORIDE, CALCIUM CHLORIDE 600; 310; 30; 20 MG/100ML; MG/100ML; MG/100ML; MG/100ML
INJECTION, SOLUTION INTRAVENOUS CONTINUOUS
Status: DISCONTINUED | OUTPATIENT
Start: 2025-06-21 | End: 2025-06-22

## 2025-06-21 RX ADMIN — THIAMINE HYDROCHLORIDE 400 MG: 100 INJECTION, SOLUTION INTRAMUSCULAR; INTRAVENOUS at 22:50

## 2025-06-21 RX ADMIN — GADOTERIDOL 17 ML: 279.3 INJECTION, SOLUTION INTRAVENOUS at 22:21

## 2025-06-21 RX ADMIN — ENOXAPARIN SODIUM 40 MG: 100 INJECTION SUBCUTANEOUS at 17:31

## 2025-06-21 RX ADMIN — SODIUM CHLORIDE, POTASSIUM CHLORIDE, SODIUM LACTATE AND CALCIUM CHLORIDE: 600; 310; 30; 20 INJECTION, SOLUTION INTRAVENOUS at 04:32

## 2025-06-21 RX ADMIN — PIPERACILLIN AND TAZOBACTAM 4.5 G: 4; .5 INJECTION, POWDER, FOR SOLUTION INTRAVENOUS at 15:17

## 2025-06-21 RX ADMIN — THIAMINE HYDROCHLORIDE 400 MG: 100 INJECTION, SOLUTION INTRAMUSCULAR; INTRAVENOUS at 05:58

## 2025-06-21 RX ADMIN — Medication 3 ML: at 09:50

## 2025-06-21 RX ADMIN — DEXTROSE AND SODIUM CHLORIDE: 5; 450 INJECTION, SOLUTION INTRAVENOUS at 00:25

## 2025-06-21 RX ADMIN — IPRATROPIUM BROMIDE AND ALBUTEROL SULFATE 3 ML: .5; 2.5 SOLUTION RESPIRATORY (INHALATION) at 09:50

## 2025-06-21 RX ADMIN — THIAMINE HYDROCHLORIDE 400 MG: 100 INJECTION, SOLUTION INTRAMUSCULAR; INTRAVENOUS at 15:15

## 2025-06-21 RX ADMIN — PIPERACILLIN AND TAZOBACTAM 4.5 G: 4; .5 INJECTION, POWDER, FOR SOLUTION INTRAVENOUS at 04:49

## 2025-06-21 RX ADMIN — HYDROCHLOROTHIAZIDE 25 MG: 25 TABLET ORAL at 05:46

## 2025-06-21 RX ADMIN — FUROSEMIDE 40 MG: 10 INJECTION, SOLUTION INTRAVENOUS at 01:38

## 2025-06-21 RX ADMIN — LANSOPRAZOLE 30 MG: 30 TABLET, ORALLY DISINTEGRATING ORAL at 05:45

## 2025-06-21 RX ADMIN — POTASSIUM CHLORIDE 40 MEQ: 1500 TABLET, EXTENDED RELEASE ORAL at 15:17

## 2025-06-21 RX ADMIN — HYDRALAZINE HYDROCHLORIDE 10 MG: 20 INJECTION INTRAMUSCULAR; INTRAVENOUS at 18:16

## 2025-06-21 RX ADMIN — ATORVASTATIN CALCIUM 20 MG: 20 TABLET, FILM COATED ORAL at 05:45

## 2025-06-21 RX ADMIN — LOSARTAN POTASSIUM 100 MG: 50 TABLET, FILM COATED ORAL at 05:46

## 2025-06-21 RX ADMIN — Medication 3 ML: at 19:27

## 2025-06-21 RX ADMIN — POTASSIUM CHLORIDE 10 MEQ: 7.46 INJECTION, SOLUTION INTRAVENOUS at 15:30

## 2025-06-21 RX ADMIN — IPRATROPIUM BROMIDE AND ALBUTEROL SULFATE 3 ML: .5; 2.5 SOLUTION RESPIRATORY (INHALATION) at 14:05

## 2025-06-21 RX ADMIN — AMLODIPINE BESYLATE 10 MG: 10 TABLET ORAL at 05:45

## 2025-06-21 RX ADMIN — HYDRALAZINE HYDROCHLORIDE 10 MG: 20 INJECTION INTRAMUSCULAR; INTRAVENOUS at 04:28

## 2025-06-21 RX ADMIN — PIPERACILLIN AND TAZOBACTAM 4.5 G: 4; .5 INJECTION, POWDER, FOR SOLUTION INTRAVENOUS at 22:51

## 2025-06-21 RX ADMIN — POTASSIUM CHLORIDE 10 MEQ: 7.46 INJECTION, SOLUTION INTRAVENOUS at 17:22

## 2025-06-21 RX ADMIN — IPRATROPIUM BROMIDE AND ALBUTEROL SULFATE 3 ML: .5; 2.5 SOLUTION RESPIRATORY (INHALATION) at 19:26

## 2025-06-21 RX ADMIN — SODIUM CHLORIDE, POTASSIUM CHLORIDE, SODIUM LACTATE AND CALCIUM CHLORIDE: 600; 310; 30; 20 INJECTION, SOLUTION INTRAVENOUS at 17:34

## 2025-06-21 RX ADMIN — Medication 3 ML: at 14:05

## 2025-06-21 ASSESSMENT — PAIN DESCRIPTION - PAIN TYPE
TYPE: ACUTE PAIN

## 2025-06-21 ASSESSMENT — FIBROSIS 4 INDEX: FIB4 SCORE: 3.7

## 2025-06-21 NOTE — PROGRESS NOTES
Referring Physician: Etta Figueroa M.D.    S:  Overnight, the patient experienced increased secretions with difficulty managing them without frequent suctioning. Dr. Martin subsequently transferred the patient to the ICU for closer monitoring and potential intubation for airway protection    Scheduled Medications   Medication Dose Frequency    sodium chloride 3%  3 mL 4X/DAY (RT)    ipratropium-albuterol  3 mL 4X/DAY (RT)    Pharmacy  1 Each PHARMACY TO DOSE    [Held by provider] finasteride  5 mg DAILY    [Held by provider] tamsulosin  0.4 mg BID    lansoprazole  30 mg DAILY    amLODIPine  10 mg DAILY    atorvastatin  20 mg DAILY    losartan  100 mg DAILY    hydroCHLOROthiazide  25 mg DAILY    enoxaparin (LOVENOX) injection  40 mg DAILY AT 1800    thiamine  400 mg Q8HRS    piperacillin-tazobactam  4.5 g Q8HRS    Pharmacy  1 Each PHARMACY TO DOSE         O:      Vitals:    06/21/25 0800   BP: (!) 140/64   Pulse: 70   Resp: 16   Temp:    SpO2: 97%       Physical Examination:   General: Patient is somnolent, grimaces and grunting to questions   Eye: Examination of optic disks not indicated at this time given acuity of consult  Neck: There is normal range of motion  CV: regular rate   Extremities:  clear, dry, intact, without peripheral edema    Neurologic Examination:  Mental status: Wakens to noxious stimuli, grunts to questions being asked and grimaces to painful stimuli   Language: Grunting in response, language unable to be assessed   Cranial nerves:    Pupils are equal, round and reactive to light  Difficulty managing secretions, appears to tolerate frequent suctioning well    Face appears symmetric   Hearing appears intact to conversation- grunts and grimaces to questions   Motor: Hypotonia. Strength testing unable to be obtained today. No pronator drift.   Reflexes: Deep tendon reflexes 2 and symmetric in the bilateral upper and lower extremities. Bilateral plantar responses are flexor.   Sensory: Unable to  assess   Coordination: Unable to assess   Gait: Deferred at this time     DX-ABDOMEN FOR TUBE PLACEMENT   Final Result         1.  Nonspecific bowel gas pattern in the upper abdomen.   2.  Nasogastric tube is coiled within the stomach, the tip terminates overlying the expected location of the gastric cardia.      DX-CHEST-PORTABLE (1 VIEW)   Final Result         1.  Hazy left lower lobe infiltrates.   2.  Cardiomegaly   3.  Atherosclerosis      DX-ABDOMEN FOR TUBE PLACEMENT   Final Result      Gastric tube terminates in the proximal stomach.      DX-CHEST-PORTABLE (1 VIEW)   Final Result         1.  No acute cardiopulmonary disease.   2.  Cardiomegaly   3.  Atherosclerosis      IR-US GUIDED PIV    (Results Pending)   MR-BRAIN-WITH & W/O    (Results Pending)          Continuous inpatient video EEG monitoring   Preliminary review     EEG monitoring thus far shows continuous generalized predominantly delta slowing of the background with superimposed faster frequency waveforms. This suggests nonspecific diffuse cerebral dysfunction.      No seizures or epileptiform discharges are observed.           Impression & Recommendations:    Kishor Nash is an 84-year-old male referred to Neurology for persistent encephalopathy. He was transferred from [facility] following alcohol withdrawal seizures and ongoing altered mental status of unclear etiology.  During his 5-day hospitalization, imaging revealed a chronic lacunar infarct in the basal ganglia. Lumbar puncture was unremarkable. Keppra was initiated for concern of subclinical seizures, and empiric antibiotics were started for possible infectious etiology. EEG demonstrated diffuse background slowing without epileptiform discharges.    Review of prior CT imaging also noted significant cerebral atrophy, raising concern for alternative underlying etiologies, such as Alzheimer’s disease, vascular dementia, or Lewy body dementia. Overnight, the patient was  transferred to the ICU due to progressive somnolence and increased secretions, with inability to manage without frequent suctioning. The ICU team contacted the patient’s wife to provide clinical updates and initiate potential palliative care discussions.    This morning, the neurological exam remains largely unchanged. The patient is somnolent but arouses briefly to painful stimuli and occasionally grunts in response to questions. He briefly opened his eyes but fatigued quickly. He remains unable to follow commands and continues to require frequent suctioning for secretion management. EEG data overnight shows no epileptiform discharges or seizures events, background remains diffusely slow.     Plan:   - MRI brain with and without contrast when able   - Lumbar puncture for CSF analysis   - cell count, glucose, protein, culture, meningitis/encephalitis panel   - DC EEG   - Neuro checks q. 4 hours   - PT/OT when able   - Palliative care consults per primary team   - Neurology will follow along peripherally to review pending repeat work up        The evaluation of the patient, and recommended management, was discussed with Dr. Hudson and the care team      ERIC Wright  Neurohospitalist Services

## 2025-06-21 NOTE — PROGRESS NOTES
4 Eyes Skin Assessment Completed by WAN Chavis and WAN Pierre.    Skin assessment is primarily focused on high risk bony prominences. Pay special attention to skin beneath and around medical devices, high risk bony prominences, skin to skin areas and areas where the patient lacks sensation to feel pain and areas where the patient previously had breakdown.     Head (Occipital):  {Eyes:22587924}   Ears (Under Medical Devices): {Ears:35792776}   Nose (Under Medical Devices): {Nose:24713580}   Mouth:  {Mouth:34097419}   Neck: {Neck:41045340}   Breast/Chest:  {Breast/Chest:05581169}   Shoulder Blades:  {Shoulder Blades:95131428}   Spine:   {Spine:16505876}   (R) Arm/Elbow/Hand: {Arm/Elbow/Hand:50751734}   (L) Arm/Elbow/Hand: {Arm/Elbow/Hand:41016682}   Abdomen: {Abdomen:69268799}   Pannus/Groin:  {Pannus/Groin:82114990}   Sacrum/Coccyx:   {Sacrum/Coccyx:20878014}   (R) Ischial Tuberosity (Sit Bones):  {Sit Bones:49283910}   (L) Ischial Tuberosity (Sit Bones):  {Sit Bones:36197820}   (R) Leg:  {Le}   (L) Leg:  {Le}   (R) Heel:  {Heel:31897618}   (R) Foot/Toe: {Foot/Toe:46898763}   (L) Heel: {Heel:33812429}   (L) Foot/Toe:  {Foot/Toe:93341956}       DEVICES IN USE:   Respiratory Devices:  {Respiratory Devices:06169027}  Feeding Devices:  {Feeding Devices:32972016}   Lines & BP Monitoring Devices:  {Lines and BP Monitoring Devices:29993208}    Orthopedic Devices:  {Orthopedic Devices:04988735}  Miscellaneous Devices:  {Miscellaneous Devices:24361691}    PROTOCOL INTERVENTIONS:   {Wound Care Interventions:12354098}    WOUND PHOTOS:   {Wound Photo:83098548}    WOUND CONSULT:   {Wound Consult:50601727}

## 2025-06-21 NOTE — THERAPY
Speech Language Therapy Contact Note    Patient Name: Kishor Nash  Age:  84 y.o., Sex:  male  Medical Record #: [id number]  Today's Date: 6/21/2025 06/21/25 1032   Treatment Variance   Reason For Missed Therapy Medical - Patient Is Not Medically Stable   Interdisciplinary Plan of Care Collaboration   IDT Collaboration with  Nursing   Collaboration Comments Pt transfered overnight to ICU. Currently discussing potential intubation due to difficulty managing secretions. SLP will continue to hold.

## 2025-06-21 NOTE — PROGRESS NOTES
4 Eyes Skin Assessment Completed by WAN Jamison and WAN Chavis.    Skin assessment is primarily focused on high risk bony prominences. Pay special attention to skin beneath and around medical devices, high risk bony prominences, skin to skin areas and areas where the patient lacks sensation to feel pain and areas where the patient previously had breakdown.     Head (Occipital):  AMELIA d/t eeg   Ears (Under Medical Devices): Red and Blanching   Nose (Under Medical Devices): WDL   Mouth:  Dry, poor dentition   Neck: Red and Blanching   Breast/Chest:  WDL   Shoulder Blades:  Red and Blanching   Spine:   Red and Blanching   (R) Arm/Elbow/Hand: Red, Blanching, Non-Blanching, Bruising, Edema, Weeping, and Scar   (L) Arm/Elbow/Hand: Abrasion, Scab, Pink, Red, Blanching, Bruising, Edema, Weeping, and Shiny   Abdomen: WDL   Pannus/Groin:  Red and Excoriation/scratched   Sacrum/Coccyx:   Red and Blanching   (R) Ischial Tuberosity (Sit Bones):  Red and Blanching   (L) Ischial Tuberosity (Sit Bones):  Red and Blanching   (R) Leg:  Shingles? Right upper thigh   (L) Leg:  WDL   (R) Heel:  Red and Blanching   (R) Foot/Toe: WDL   (L) Heel: Red and Blanching   (L) Foot/Toe:  WDL       DEVICES IN USE:   Respiratory Devices:  Nasal cannula  Feeding Devices:  NG tube   Lines & BP Monitoring Devices:  Peripheral IV, BP cuff, and Pulse ox    Orthopedic Devices:  N/A  Miscellaneous Devices:  Condom cath and SCDs    PROTOCOL INTERVENTIONS:   ICU Low Airloss Bed:  Applied this assessment  Offloading Dressing - Sacrum:  Applied this assessment  Offloading Dressing - Heel:  Applied this assessment  Q2 Turns with Wedges:  Applied this assessment  Glide Sheet:  Applied this assessment  Dri-Maurice/Micro Climate Pads:  Applied this assessment  Barrier Paste:  Applied this assessment  Condom Cath/Purewick:  Applied this assessment    WOUND PHOTOS:   Completed and in EPIC     WOUND CONSULT:   Consult ordered for the following areas right  Anesthesia Pre Eval Note    Anesthesia ROS/Med Hx    Overall Review:  EKG was reviewed and Echo was reviewed     Anesthetic Complication History:    History of postoperative nausea & vomiting    Pulmonary Review:    Positive for sleep apnea     Cardiovascular Review:    Positive for CHF (acute on chronic systolic (congestive) and diastolic (congestive) heart failure)Positive for CAD  Negative for angina  Positive for dysrhythmias - Chronic A-fib  Negative for FRASER/SOB  Positive for hypertension  Positive for hyperlipidemia    GI/HEPATIC/RENAL Review:    Negative for GERD  Positive for renal disease (ckd)    End/Other Review:    Positive for hypothyroidism  Positive for anemia  Positive for cancer  Additional Results:  EKG:  Encounter Date: 10/14/21  -ELECTROCARDIOGRAM 12-LEAD:                                                                 Narrative    Atrial fibrillation    Nonspecific ST abnormality    Abnormal ECG Echo:  Ejection Fraction       Date                     Value               Ref Range           Status                10/12/2021               53.0                %                   Final            ----------   ALLERGIES:   -- Keflex -- PRURITUS   -- Metronidazole -- NAUSEA    --  Nausea   -- Oxycodone -- Other (See Comments)    --  itching     Prior to Admission medications :  Medication metoPROLOL tartrate (LOPRESSOR) 50 MG tablet, Sig TAKE 1 TABLET NIGHTLY, Start Date 8/19/21, End Date , Taking? Yes, Authorizing Provider Ann Humphreys MD    Medication Xarelto 20 MG Tab, Sig TAKE 1 TABLET DAILY WITH BREAKFAST, Start Date 7/21/21, End Date , Taking? Yes, Authorizing Provider Ann Humphreys MD    Medication escitalopram (LEXAPRO) 10 MG tablet, Sig Take 1 tablet by mouth daily., Start Date 7/21/21, End Date , Taking? Yes, Authorizing Provider Ann Humphreys MD    Medication oxybutynin (DITROPAN) 5 MG tablet, Sig Take 1 tablet by mouth 2 times daily., Start Date 7/21/21, End Date , Taking? Yes,  Authorizing Provider Ann Humphreys MD    Medication dilTIAZem (CARDIZEM LA) 240 MG 24 hr tablet, Sig TAKE 1 TABLET DAILY, Start Date 6/7/21, End Date , Taking? Yes, Authorizing Provider Ann Humphreys MD    Medication atorvastatin (LIPITOR) 20 MG tablet, Sig Take 1 tablet by mouth daily., Start Date 5/5/21, End Date , Taking? Yes, Authorizing Provider Ann Humphreys MD    Medication levothyroxine 125 MCG tablet, Sig TAKE 1 TABLET DAILY, Start Date 4/20/21, End Date , Taking? Yes, Authorizing Provider Ann Humphreys MD    Medication traZODone (DESYREL) 50 MG tablet, Sig Take 1 tablet by mouth nightly., Start Date 2/15/21, End Date , Taking? Yes, Authorizing Provider Ann Humphreys MD    Medication linaclotide (LINZESS) 145 MCG capsule, Sig Take 1 capsule by mouth daily (before breakfast).  Patient taking differently: Take 145 mcg by mouth as needed. , Start Date 10/2/20, End Date , Taking? Yes, Authorizing Provider Ann Humphreys MD    Medication brimonidine (ALPHAGAN) 0.2 % ophthalmic solution, Sig Place 1 drop into both eyes 2 times daily., Start Date 8/21/20, End Date , Taking? Yes, Authorizing Provider Michelle Diggs MD    Medication dorzolamide-timolol (COSOPT) 22.3-6.8 MG/ML ophthalmic solution, Sig Place 1 drop into both eyes 2 times daily., Start Date 8/21/20, End Date , Taking? Yes, Authorizing Provider Michelle Diggs MD    Medication metroNIDAZOLE (Metrogel) 1 % gel, Sig Apply topically daily., Start Date 10/8/21, End Date , Taking? , Authorizing Provider FARIDA Ziegler            Relevant Problems   No relevant active problems       Physical Exam     Airway   Mallampati: II  TM Distance: >3 FB    Cardiovascular  Cardiovascular exam normal    Head Assessment  Head assessment: Normocephalic and Atraumatic    General Assessment  General Assessment: Alert and oriented and No acute distress    Dental Exam    Patient has:  Poor Dentition  Dental Note: Numerous missing teeth    Legend: C=Chipped   thigh, groin, sacrum, scrotum     M=Missing  L=Loose B=Bridge Cr=[place] I=Implant    Pulmonary Exam  Pulmonary exam normal    Abdominal Exam  Abdominal exam normal      Anesthesia Plan:    ASA Status: 3  Anesthesia Type: MAC    Induction: Intravenous  Preferred Airway Type: Mask  Maintenance: TIVA  Premedication: Oral      Post-op Pain Management: Per Surgeon      Checklist  Reviewed: Lab Results, Allergies, Medications, Consultations, EKG, Patient Summary, Beta Blocker Status, Problem list, Past Med History, DNR Status and NPO Status  Consent/Risks Discussed Statement:  The proposed anesthetic plan, including its risks and benefits, have been discussed with the Patient along with the risks and benefits of alternatives. Questions were encouraged and answered and the patient and/or representative understands and agrees to proceed.    I have discussed elements of the patient's history or examination, as noted above and/or as follows, that place the patient at higher risk of complications; BMI> 30 (obesity), heart disease, sleep apnea, vascular disease and kidney disease.    I discussed with the patient (and/or patient's legal representative) the risks and benefits of the proposed anesthesia plan, MAC, which may include services performed by other anesthesia providers.    Alternative anesthesia plans, if available, were reviewed with the patient (and/or patient's legal representative). Discussion has been held with the patient (and/or patient's legal representative) regarding risks of anesthesia, which include allergic reaction, aspiration, conversion to general anesthesia, dental injury, intra-operative awareness, hypotension, emergence delirium, need for blood transfusion, nausea, nerve injury, oral injury, post-op intubation, sore throat and vomiting and emergent situations that may require change in anesthesia plan.    The patient (and/or patient's legal representative) has indicated understanding, his/her questions have been answered, and he/she  wishes to proceed with the planned anesthetic.      Blood Products: Not Anticipated

## 2025-06-21 NOTE — WOUND TEAM
Received wound consult for R thigh, groin, and scrotum.  R thigh evaluated by Wound Team on 6/19, dressing orders already in place.  Groin and scrotum with MASD, barrier paste ordered.    Wound consult completed, wound team not following.  Re-consult if patient has further advanced wound care needs.

## 2025-06-21 NOTE — PROGRESS NOTES
Critical Care Medicine Faculty Consult Note    Consulted by: Brock Baxter PA-C    Reason for consult: Worsening mental status, NT suction needs    HPI: 84y M hx of Chol, HTN, BPH, depression, limited alcohol use per my discussion with wife, that was transferred from [facility]/13/[facility]25 for encephalopathy. He was found to have remote left basal ganglia lacunar infarct, underwent LP which was negative and was transferred here for EEG evaluation. ON my review of CT head has significant atrophy. Tonight I was called since he has worsening GCS and worsening airway reflexes needing NT suction. His vital have been stable, no fever, on 4l n/c, he has normal ammonia, TSH, LP reviewed, high dose thiamine. Neurology consulting, EEG thus far with no seizures. I called his wife tonight she describes that the last quite of few months he has been having slow decline but most significant 1 week prior he was able to pay bills, and put out his pills. They have never had advance care planning or discussion of what he would want and she thinks that due to the fact that his father never wanted to talk about it and that he would live for ever. He currently is full code.     Exam:elderly male seen on T720 no acute distress resting with EEG leads on. He with stimulation opens his eyes up and looks at you is nonverbal except and grunt has a cough and gag reflexes, lungs clear to auscultation no wheezing or ronchi or respiratory distress, skin warm with out pallor or mottling.     A/P:  Encephalopathy: seems acute on chronic and likely vascular dementia related. Currently possible hypoactive delirium or sepsis induced delirium. Check ABG, phos and ical. Appreciate neurology consultation. Would like to stop zosyn if possible as and additional cause of antibiotic encephalopathy. Continue high dose thiamine. No significant alcohol use disorder per report. Transfer to ICU.     Aspiration risk: continue airway monitoring and need for  intubation, check ABG, head of bed > 30 degrees, trial of hypertonic saline nebulization with duo.     Mild hypernatremia: agree with 1/2 NS or LR    Goals of care: discussed with wife and they never had conversation about this in the pass. He has 5 sisters and 1 brother will engage family and place palliative care consult. Concerned intubation and cpr at 84 is aggressive and concern of no real improvement or findings with likely vascular dementia. He currently remains full code.     Patient remains in critical condition from NT suction airway monitoring and encephalopathy. Critical care time provided was 40 minutes. This excludes all separate billable procedures.     Please see [facility] notes for additional documentation    Edgard Martin MD  Critical Care Medicine

## 2025-06-21 NOTE — PROGRESS NOTES
~2000: This RN entered patient's room to perform head to toe assessment. Patient noted to only respond to pain, and unable to follow commands. During assessment, patient's work of breathing was noted to be of moderate effort, in addition to increased secretions and the patient's inability to mange and clear secretions independently. Oral care and oral suctioning provided to patient, with minimal improvement noted. With the assistance of another RN, NT suction was performed. Secretions were noted to be thick in consistency, yellow and bloody in color. Patient able to tolerate suction.     ~2240: Patient' noted to have increased work of breathing, as well as increased secretion production. Patient still unable to manage and clear secretions independently. RRT RN consulted to assess patient due to concern for airway management. RRT RN at bedside with this RN. Patient still noted to only respond to pain, and unable to follow commands. NT suction performed by RN. Secretions were noted to be slightly thicker in consistency, but remained yellow and bloody in color. Patient's oxygen saturation noted to be low while on 2L nasal cannula. Patient's oxygen increased to 6L nasal cannula. Patient oxygen saturation noted to improve to mid 90's. Patient able to tolerate suction well.     ~ 2330: On-call hospitalist Brock Baxter PA-C notified via Voalte of assessment findings, including needs for NT suction. Respiratory therapy contacted via Voalte, requested if they could come and see patient at bedside. On-call hospitalist present at bedside to assess patient. New orders received to stop tube feeding for the night as there was a concern for possible aspiration. STAT chest x-ray ordered. Continuous IV fluids held per provider.     ~0140: Patient noted to have worsening increase work of breathing, and worsening amount of secretions. This RN provided oral suctioning, with minimal improvement in patient condition. RN proceeded to NT  suction patient, but advancing NT suction catheter noted to be difficult. This RN reached out to RRT RN for assessment of patient. RRT RN arrived at patient bedside. RRT RN reported insertion of NT suction catheter to be challenging, but successful. Hiowever, RRT RN noted to have a difficult time being able to suction secretions out as they had become thicker in consistency, and hard to pass through suction catheter. RRT RN reached out to on-call hospitalist to provide update on patient condition and new findings pertaining to patient's ability to clear and maintain airway.     ~0215: On-call hospitalist and intensivist at bedside. Transfer orders received.

## 2025-06-21 NOTE — CARE PLAN
The patient is Watcher - Medium risk of patient condition declining or worsening    Shift Goals  Clinical Goals: SBP < 180, Q4 neuro, stable/improved neuro exam  Patient Goals: AMELIA  Family Goals: AMELIA    Progress made toward(s) clinical / shift goals:        Problem: Fall Risk  Goal: Patient will remain free from falls  Outcome: Progressing     Problem: Pain - Standard  Goal: Alleviation of pain or a reduction in pain to the patient’s comfort goal  Outcome: Progressing     Problem: Skin Integrity  Goal: Skin integrity is maintained or improved  Outcome: Progressing       Patient is not progressing towards the following goals:      Problem: Knowledge Deficit - Standard  Goal: Patient and family/care givers will demonstrate understanding of plan of care, disease process/condition, diagnostic tests and medications  Outcome: Not Progressing     Problem: Communication  Goal: The ability to communicate needs accurately and effectively will improve  Outcome: Not Progressing

## 2025-06-21 NOTE — PROGRESS NOTES
Called report to WAN Corey in [facility]. All questions and concerns answered and addressed at this time.

## 2025-06-21 NOTE — ACP (ADVANCE CARE PLANNING)
Advance Care Planning Note    Discussion date:  6/21/2025  Discussion participants:  spouse, brother, and daughter    The patient wishes to discuss Advanced Care Planning today and the following is a brief summary of our discussion.    Patient does not have  capacity to make their own medical decisions.  Health Care Agent/Surrogate Decision Maker not documented in chart.    Documents of Advance Directives:  None    Communication of relevant diagnoses: Kishor is here with altered mental status which has been slightly progressed since his time here.  He has undergone a pretty extensive workup with neurology and they have concluded to the best of their ability that this is likely an acute onset of some type of vascular or Lewy body dementia.  All of the acute reversible causes of altered mental status have been assessed and ruled out.  An MRI is pending but the results, I am told will be unlikely to change the outcome.  I had an extensive conversation with his wife Beatris today as well as his brother Khushboo and his oldest daughter Lou today.  Although the communication between the family members seems to be somewhat splintered they all independently agreed to the same thing.  Khushboo felt like it was not really his decision to make as Kishor is  to Beatris and Kishor also has adult children.  He essentially deferred the decision to them but was very agreeable with my suggestions.  Beatris will hold quite a bit today.  I am unsure of her ability to truly grasp of the situation Kishor is in.  I get the feeling that he has been declining for longer than we initially thought as she states he has been taking many naps lately and sleeping for long periods of time.  She states also for the past several months he has been having forgetful episodes but has still been driving his truck and taking care of bills.  He seems to have quite a waxing and waning mental status even at home.  His daughter Lou provides the information that he  was diagnosed with dementia this past year although at the time they were told it was mild.  They all feel that given his age and the lack of easily identified reversible causes for his mental status that he would not want to be kept alive on a ventilator indefinitely.  It is quite difficult to prognosticate Kishor as he does not currently have a clear diagnosis however if this is progressive dementia there is little hope of a significant recovery.  Today he is oscillating between being unable to protect his airway and extremely somnolent to A&O x 3 responding to simple questions.     Communication of prognosis: guarded    Communication of treatment goals/options: The options we discussed today were essentially changed her CODE STATUS to DNR/DNI from full code.  Although I did not get explicit answers from Lou or Khushboo they did seem in agreement with this during my conversations with them and Beatris was very clear before she left as she did not want him to be intubated.  We also talked about hospice briefly but this is not a decision that needs to be made at this time.    Treatment decisions: Change of CODE STATUS to DNAR, DO NOT INTUBATE DO NOT RESUSCITATE    Code status:  do not attempt resuscitation (DNAR)    The patient's family would like: Full treatment up to the point of respiratory or cardiac arrest at this time.     Time statement:  I discussed advance care planning with the patient's family for at least 60 minutes, including diagnosis, prognosis, plan of care, risks and benefits of any therapies that could be offered, as well as alternatives including palliation and hospice, as appropriate, exclusive of evaluation and management or other separately billable procedures.    Etta Figueroa M.D.

## 2025-06-21 NOTE — PROGRESS NOTES
"Pulmonary & Critical Care Progress Note    Date of admission  6/19/2025    Chief Complaint  84 y.o. male admitted 6/19/2025 with altered mental status    Hospital Course  \"84y M hx of Chol, HTN, BPH, depression, limited alcohol use per my discussion with wife, that was transferred from [facility] 6/13/2025 for encephalopathy. He was found to have remote left basal ganglia lacunar infarct, underwent LP which was negative and was transferred here for EEG evaluation. ON my review of CT head has significant atrophy. Tonight I was called since he has worsening GCS and worsening airway reflexes needing NT suction. His vital have been stable, no fever, on 4l n/c, he has normal ammonia, TSH, LP reviewed, high dose thiamine. Neurology consulting, EEG thus far with no seizures. I called his wife tonight she describes that the last quite of few months he has been having slow decline but most significant 1 week prior he was able to pay bills, and put out his pills. They have never had advance care planning or discussion of what he would want and she thinks that due to the fact that his father never wanted to talk about it and that he would live for ever. He currently is full code.      Exam:elderly male seen on T720 no acute distress resting with EEG leads on. He with stimulation opens his eyes up and looks at you is nonverbal except and grunt has a cough and gag reflexes, lungs clear to auscultation no wheezing or ronchi or respiratory distress, skin warm with out pallor or mottling. \" Dr. Martin    Interval Problem Update  ICU CHECKLIST:  Chart review from the past 24 hours includes imaging, laboratory studies, vital signs and notes available.  Pertinent system review for today's visit includes:  Significant overnight events:     Neuro: CT head with old lacunar stroke. LP at OSH negative.  Patient is responsive only to noxious and repetitive physical stimuli.  He occasionally says words in response to questions but " does not focus and is not conversational.  Protecting airway currently although high risk for aspiration.    Cardiac: HR 70-90, -180/60-90  Pulmonary: 4L 96%   Heme: Anemia, iron deficiency    DVT Prophylaxis: Lovenox  /Renal: WNL   I/O: 3273/2400 = +873 = -501   Escobar: No  ID: Respiratory Viral Panel negative, blood cultures negative    Skin: intact   GI/Nutrition: Prealbumin 7.0    Prophylaxis: Home Prevacid  Endo: Glucose 122-148  Additional data:   Lines/Tubes: OGT      Review of Systems  Review of Systems   Unable to perform ROS: Mental status change        Vital Signs for last 24 hours   Temp:  [36.2 °C (97.2 °F)-36.7 °C (98.1 °F)] 36.7 °C (98.1 °F)  Pulse:  [82-92] 82  Resp:  [18-24] 24  BP: (143-187)/() 172/73  SpO2:  [92 %-96 %] 92 %    Hemodynamic parameters for last 24 hours       Respiratory Information for the last 24 hours       Physical Exam   Physical Exam  Constitutional:       Appearance: Normal appearance.   HENT:      Head: Atraumatic.   Cardiovascular:      Rate and Rhythm: Normal rate and regular rhythm.   Pulmonary:      Effort: Pulmonary effort is normal. No respiratory distress.      Breath sounds: Rhonchi present. No wheezing or rales.   Abdominal:      General: Abdomen is flat.   Musculoskeletal:         General: No swelling.   Skin:     General: Skin is warm and dry.   Neurological:      Mental Status: He is alert.      Comments: Disoriented, intermittently will verbally respond to simple questions, not following commands.         Medications  Current Medications[1]    Fluids    Intake/Output Summary (Last 24 hours) at 6/21/2025 0616  Last data filed at 6/21/2025 0600  Gross per 24 hour   Intake 90 ml   Output 2075 ml   Net -1985 ml       Laboratory      Recent Labs     06/19/25  0311   CPKTOTAL 94     Recent Labs     06/19/25  0311 06/20/25  0301   SODIUM 149* 146*   POTASSIUM 3.7 3.6   CHLORIDE 117* 117*   CO2 20 21   BUN 27* 22   CREATININE 0.95 0.83   MAGNESIUM 2.1  --     PHOSPHORUS 3.3  --    CALCIUM 8.3* 8.2*     Recent Labs     06/19/25  0311 06/20/25  0301   ALTSGPT 10 12   ASTSGOT 18 25   ALKPHOSPHAT 57 60   TBILIRUBIN 0.4 0.4   PREALBUMIN  --  7.0*   GLUCOSE 124* 148*     Recent Labs     06/19/25  0311 06/20/25  0301   WBC 10.8 9.6   NEUTSPOLYS 79.30*  --    LYMPHOCYTES 8.60*  --    MONOCYTES 9.60  --    EOSINOPHILS 1.20  --    BASOPHILS 0.60  --    ASTSGOT 18 25   ALTSGPT 10 12   ALKPHOSPHAT 57 60   TBILIRUBIN 0.4 0.4     Recent Labs     06/19/25  0311 06/20/25  0301   RBC 3.21* 3.25*   HEMOGLOBIN 10.2* 10.4*   HEMATOCRIT 32.0* 32.2*   PLATELETCT 172 166   IRON 15*  --    FERRITIN 131.0  --    TOTIRONBC 215*  --        Imaging  X-Ray:  I have personally reviewed the images and compared with prior images.  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  - Extensive workup has been negative for reversible causes  - MRI ordered but will very likely not change the plan of care  - LP done, repeat LP discontinued after review of imaging as unlikely to be revealing in he setting of high suspicion for dementia.  #Remote left Basal Ganglia infarct  - LP: WBC 1, RBC 1, protein 72, glucose 73, total of 14 cells were seen, of which 13 were monocytes and 1 more PMN   Plan:  - Continue airway monitoring in ICU   - Continue GOC discussions  - Neurology following  - EEG - negative  - Seizure and aspiration precautions  - Continue thiamine  - On empiric abx  - Neurochecks Q4H  - Holding Keppra  - If able to safely obtain MRI, will proceed however unlikely to change plan of care  - No further sedative medications     #Acute Hypoxic Respiratory Failure  Plan:  - HOB elevation  - Aspiration precautions  - Titrate oxygen to an SpO2 of 88-94%  - Airway clearance as needed  - Avoid further sedative medications    #History of Hypertension  Plan:  - Continue home meds - Amlodipine, HCTZ and losartan    #History of  Hyperlipidemia  Plan:  - Continue home statin: atorvastatin    #Depression    #Reported alcohol abuse disorder - drinks 1-2 alcoholic beverages  Plan:  - Without impression Etoh history    #History of BPH  Plan:  - Continue home flomax    VTE:  Lovenox  Ulcer: PPI  Lines: None    I have performed a physical exam and reviewed and updated ROS and Plan today (6/21/2025). In review of yesterday's note (6/20/2025), there are no changes except as documented above.     Discussed patient condition and risk of morbidity and/or mortality with RN, RT, Pharmacy, and   The patient remains critically ill.  Critical care time = 120 minutes in directly providing and coordinating critical care and extensive data review.  No time overlap and excludes procedures.    Etta Figueroa MD RD  Pulmonary and Critical Care    Available on Voalte         [1]   Current Facility-Administered Medications   Medication Dose Route Frequency Provider Last Rate Last Admin    albuterol (Proventil) 2.5mg/0.5ml nebulizer solution 2.5 mg  2.5 mg Nebulization Q4H PRN (RT) Edgard Martin M.D.        sodium chloride 3% nebulizer solution 3 mL  3 mL Nebulization 4X/DAY (RT) Edgard Martin M.D.        ipratropium-albuterol (DUONEB) nebulizer solution  3 mL Nebulization 4X/DAY (RT) Edgard Martin M.D.        lactated ringers infusion   Intravenous Continuous Edgard Martin M.D. 75 mL/hr at 06/21/25 0432 New Bag at 06/21/25 0432    Pharmacy Consult Request  1 Each Other PHARMACY TO DOSE Radha Wright        [Held by provider] finasteride (Proscar) tablet 5 mg  5 mg Oral DAILY Barry Whyte M.D.        [Held by provider] tamsulosin (Flomax) capsule 0.4 mg  0.4 mg Oral BID Barry Whyte M.D.        lansoprazole (Prevacid) solutab 30 mg  30 mg Enteral Tube DAILY Barry Whyte M.D.   30 mg at 06/21/25 0545    amLODIPine (Norvasc) tablet 10 mg  10 mg Enteral Tube DAILY Barry Whyte M.D.   10 mg at 06/21/25 0545    atorvastatin (Lipitor)  tablet 20 mg  20 mg Enteral Tube DAILY Barry Whyte M.D.   20 mg at 06/21/25 0545    losartan (Cozaar) tablet 100 mg  100 mg Enteral Tube DAILY Barry Whyte M.D.   100 mg at 06/21/25 0546    hydroCHLOROthiazide tablet 25 mg  25 mg Oral DAILY Barry Whyte M.D.   25 mg at 06/21/25 0546    Respiratory Therapy Consult   Nebulization Continuous RT Russell Lawrence M.D.        enoxaparin (Lovenox) inj 40 mg  40 mg Subcutaneous DAILY AT 1800 Russell Lawrence M.D.   40 mg at 06/20/25 1638    hydrALAZINE (Apresoline) injection 10 mg  10 mg Intravenous Q4HRS PRN Russell Lawrence M.D.   10 mg at 06/21/25 0428    thiamine (B-1) 400 mg in dextrose 5% 100 mL IVPB  400 mg Intravenous Q8HRS Russell Lawrence M.D. 200 mL/hr at 06/21/25 0558 400 mg at 06/21/25 0558    piperacillin-tazobactam (Zosyn) 4.5 g in  mL IVPB  4.5 g Intravenous Q8HRS Russell Lawrence M.D. 25 mL/hr at 06/21/25 0449 4.5 g at 06/21/25 0449    Pharmacy Consult: Enteral tube insertion - review meds/change route/product selection  1 Each Other PHARMACY TO DOSE Barry Whyte M.D.        acetaminophen (Tylenol) tablet 650 mg  650 mg Enteral Tube Q6HRS PRN Barry Whyte M.D.        ondansetron (Zofran) syringe/vial injection 4 mg  4 mg Intravenous Q4HRS PRN Barry Whyte M.D.        Or    ondansetron (Zofran ODT) dispertab 4 mg  4 mg Enteral Tube Q4HRS PRN Barry Whyte M.D.

## 2025-06-21 NOTE — PROGRESS NOTES
Monitor summary: SR 83-94, NH 0.17, QRS 0.09, QT 0.44, with rare PVCS per strip from monitor room.              Nursing Notes:   Stephania Mcfarlane LPN  11/29/2022  8:25 AM  Sign at exiting of workspace  Chief Complaint   Patient presents with     Pain     Neck and upper back      Here today for PT referral for back and neck pain.     Medication Reconciliation: danny Mcfarlane LPN      Palak Durand is a 31 year old, presenting for the following health issues:  Pain (Neck and upper back )    Has had recent spasm in her upper back and neck area.  Mostly on the right side in the trapezius region.  Usually can get rid of spasms with heat, massage and ibuprofen, but issues are lingering.  Would like to try Physical Therapy because it is not resolving as normal.  No weakness or tingling.  Hard to turn neck.  Has had issues since college, but worse lately.  She wears H cup bra and is interested in having a breast reduction in the future once she is done having children, but isn't ready to do that yet.  She has 2 toddlers and feels that her symptoms have been worse since having kids with carrying them, heavy bags and also with weight gain over the past few years.  No radicular pain, tingling or numbness of her upper extremities.      Pain    History of Present Illness       Reason for visit:  Upper back/neck muscle strain. Want referral to pt or chiro    She eats 2-3 servings of fruits and vegetables daily.She consumes 0 sweetened beverage(s) daily.She exercises with enough effort to increase her heart rate 9 or less minutes per day.  She exercises with enough effort to increase her heart rate 3 or less days per week. She is missing 2 dose(s) of medications per week.  She is not taking prescribed medications regularly due to remembering to take.       Neck and upper back pain       Review of Systems   Constitutional, HEENT, cardiovascular, pulmonary, GI, , musculoskeletal, neuro, skin, endocrine and psych systems are negative, except as otherwise noted.      Objective    /80   Pulse 90   Temp (!) 96.7  F  (35.9  C) (Tympanic)   Resp 16   SpO2 97%   Breastfeeding No   There is no height or weight on file to calculate BMI.  Physical Exam  Constitutional:       Appearance: Normal appearance.   HENT:      Head: Normocephalic.   Eyes:      Extraocular Movements: Extraocular movements intact.      Pupils: Pupils are equal, round, and reactive to light.   Musculoskeletal:      Comments: No spinous process tenderness in her cervical spine.  Has pain in her right trapezius muscles from her posterior shoulder into her neck.  Normal  strength.   Neurological:      Mental Status: She is alert.               Assessment & Plan   Kizzy Chan is a 31 year old, presenting for the following health issues:      ICD-10-CM    1. Strain of neck muscle, initial encounter  S16.1XXA Physical Therapy Referral        1.  Muscle strain appears to be in trapezius distribution.  Referred to Physical Therapy.  Declined muscle relaxer.  Will continue to use tylenol and ibuprofen as needed.  Follow up if not improving.      Lenora Osborne MD  [facility]

## 2025-06-22 ENCOUNTER — APPOINTMENT (OUTPATIENT)
Dept: RADIOLOGY | Facility: MEDICAL CENTER | Age: 85
DRG: 070 | End: 2025-06-22
Attending: INTERNAL MEDICINE
Payer: MEDICARE

## 2025-06-22 PROBLEM — R13.12 OROPHARYNGEAL DYSPHAGIA: Status: ACTIVE | Noted: 2025-06-22

## 2025-06-22 PROBLEM — B02.9 HERPES ZOSTER WITHOUT COMPLICATION: Status: ACTIVE | Noted: 2025-06-22

## 2025-06-22 LAB
ANION GAP SERPL CALC-SCNC: 9 MMOL/L (ref 7–16)
BACTERIA WND AEROBE CULT: NORMAL
BUN SERPL-MCNC: 15 MG/DL (ref 8–22)
CALCIUM SERPL-MCNC: 8.2 MG/DL (ref 8.5–10.5)
CHLORIDE SERPL-SCNC: 106 MMOL/L (ref 96–112)
CO2 SERPL-SCNC: 28 MMOL/L (ref 20–33)
CREAT SERPL-MCNC: 0.81 MG/DL (ref 0.5–1.4)
ERYTHROCYTE [DISTWIDTH] IN BLOOD BY AUTOMATED COUNT: 44.5 FL (ref 35.9–50)
ERYTHROCYTE [SEDIMENTATION RATE] IN BLOOD BY WESTERGREN METHOD: >140 MM/HOUR (ref 0–20)
GFR SERPLBLD CREATININE-BSD FMLA CKD-EPI: 86 ML/MIN/1.73 M 2
GLUCOSE SERPL-MCNC: 106 MG/DL (ref 65–99)
GRAM STN SPEC: NORMAL
HCT VFR BLD AUTO: 28 % (ref 42–52)
HGB BLD-MCNC: 8.9 G/DL (ref 14–18)
MCH RBC QN AUTO: 31 PG (ref 27–33)
MCHC RBC AUTO-ENTMCNC: 31.8 G/DL (ref 32.3–36.5)
MCV RBC AUTO: 97.6 FL (ref 81.4–97.8)
PLATELET # BLD AUTO: 212 K/UL (ref 164–446)
PMV BLD AUTO: 9.3 FL (ref 9–12.9)
POTASSIUM SERPL-SCNC: 3.2 MMOL/L (ref 3.6–5.5)
RBC # BLD AUTO: 2.87 M/UL (ref 4.7–6.1)
RHEUMATOID FACT SER IA-ACNC: <10 IU/ML (ref 0–14)
SIGNIFICANT IND 70042: NORMAL
SITE SITE: NORMAL
SODIUM SERPL-SCNC: 143 MMOL/L (ref 135–145)
SOURCE SOURCE: NORMAL
WBC # BLD AUTO: 9.1 K/UL (ref 4.8–10.8)

## 2025-06-22 PROCEDURE — 86431 RHEUMATOID FACTOR QUANT: CPT

## 2025-06-22 PROCEDURE — 99233 SBSQ HOSP IP/OBS HIGH 50: CPT | Performed by: HOSPITALIST

## 2025-06-22 PROCEDURE — 700111 HCHG RX REV CODE 636 W/ 250 OVERRIDE (IP): Mod: JZ | Performed by: INTERNAL MEDICINE

## 2025-06-22 PROCEDURE — 700105 HCHG RX REV CODE 258: Performed by: INTERNAL MEDICINE

## 2025-06-22 PROCEDURE — 85652 RBC SED RATE AUTOMATED: CPT

## 2025-06-22 PROCEDURE — 700111 HCHG RX REV CODE 636 W/ 250 OVERRIDE (IP): Mod: JZ | Performed by: STUDENT IN AN ORGANIZED HEALTH CARE EDUCATION/TRAINING PROGRAM

## 2025-06-22 PROCEDURE — A9270 NON-COVERED ITEM OR SERVICE: HCPCS | Performed by: STUDENT IN AN ORGANIZED HEALTH CARE EDUCATION/TRAINING PROGRAM

## 2025-06-22 PROCEDURE — 86038 ANTINUCLEAR ANTIBODIES: CPT

## 2025-06-22 PROCEDURE — 80048 BASIC METABOLIC PNL TOTAL CA: CPT

## 2025-06-22 PROCEDURE — 700102 HCHG RX REV CODE 250 W/ 637 OVERRIDE(OP): Performed by: INTERNAL MEDICINE

## 2025-06-22 PROCEDURE — 302136 NUTRITION PUMP: Performed by: INTERNAL MEDICINE

## 2025-06-22 PROCEDURE — A9270 NON-COVERED ITEM OR SERVICE: HCPCS | Performed by: INTERNAL MEDICINE

## 2025-06-22 PROCEDURE — 36415 COLL VENOUS BLD VENIPUNCTURE: CPT

## 2025-06-22 PROCEDURE — 700102 HCHG RX REV CODE 250 W/ 637 OVERRIDE(OP): Performed by: STUDENT IN AN ORGANIZED HEALTH CARE EDUCATION/TRAINING PROGRAM

## 2025-06-22 PROCEDURE — 770020 HCHG ROOM/CARE - TELE (206)

## 2025-06-22 PROCEDURE — 700105 HCHG RX REV CODE 258: Performed by: STUDENT IN AN ORGANIZED HEALTH CARE EDUCATION/TRAINING PROGRAM

## 2025-06-22 PROCEDURE — 85027 COMPLETE CBC AUTOMATED: CPT

## 2025-06-22 RX ADMIN — LOSARTAN POTASSIUM 100 MG: 50 TABLET, FILM COATED ORAL at 05:34

## 2025-06-22 RX ADMIN — ENOXAPARIN SODIUM 40 MG: 100 INJECTION SUBCUTANEOUS at 18:32

## 2025-06-22 RX ADMIN — ACYCLOVIR SODIUM 550 MG: 500 INJECTION, SOLUTION INTRAVENOUS at 22:06

## 2025-06-22 RX ADMIN — POTASSIUM BICARBONATE 50 MEQ: 978 TABLET, EFFERVESCENT ORAL at 07:19

## 2025-06-22 RX ADMIN — AMLODIPINE BESYLATE 10 MG: 10 TABLET ORAL at 05:35

## 2025-06-22 RX ADMIN — SODIUM CHLORIDE, POTASSIUM CHLORIDE, SODIUM LACTATE AND CALCIUM CHLORIDE: 600; 310; 30; 20 INJECTION, SOLUTION INTRAVENOUS at 07:14

## 2025-06-22 RX ADMIN — PIPERACILLIN AND TAZOBACTAM 4.5 G: 4; .5 INJECTION, POWDER, FOR SOLUTION INTRAVENOUS at 12:45

## 2025-06-22 RX ADMIN — ACETAMINOPHEN 650 MG: 325 TABLET ORAL at 07:54

## 2025-06-22 RX ADMIN — POTASSIUM BICARBONATE 50 MEQ: 978 TABLET, EFFERVESCENT ORAL at 11:30

## 2025-06-22 RX ADMIN — HYDROCHLOROTHIAZIDE 25 MG: 25 TABLET ORAL at 05:35

## 2025-06-22 RX ADMIN — PIPERACILLIN AND TAZOBACTAM 4.5 G: 4; .5 INJECTION, POWDER, FOR SOLUTION INTRAVENOUS at 05:34

## 2025-06-22 RX ADMIN — ACYCLOVIR SODIUM 550 MG: 500 INJECTION, SOLUTION INTRAVENOUS at 14:10

## 2025-06-22 RX ADMIN — ATORVASTATIN CALCIUM 20 MG: 20 TABLET, FILM COATED ORAL at 05:35

## 2025-06-22 RX ADMIN — LANSOPRAZOLE 30 MG: 30 TABLET, ORALLY DISINTEGRATING ORAL at 05:34

## 2025-06-22 RX ADMIN — PIPERACILLIN AND TAZOBACTAM 4.5 G: 4; .5 INJECTION, POWDER, FOR SOLUTION INTRAVENOUS at 21:59

## 2025-06-22 ASSESSMENT — PAIN DESCRIPTION - PAIN TYPE
TYPE: ACUTE PAIN;CHRONIC PAIN
TYPE: ACUTE PAIN

## 2025-06-22 NOTE — PROGRESS NOTES
[facility] Medicine Daily Progress Note    Date of Service  6/22/2025    Chief Complaint  Kishor Nash is a 84 y.o. male admitted 6/19/2025 with encephalopathy.    Hospital Course    Patient is an 84-year-old male with past medical history of BPH, HLD, HTN, depression, alcohol use disorder that presented to [facility] on 6/13/2025 with acute encephalopathy.  Admitted with mild hypokalemia, mild KAYLIE, and mild leukocytosis.  CT head showing remote left basal ganglia lacunar infarct.  CTA head and neck without acute abnormality.  Patient was started on IV fluids and high-dose IV thiamine, as well as CIWA protocol.  Teleneurology was consulted recommending LP and continuous EEG to rule out nonconvulsive status epilepticus.  Received Keppra 2 g IV x 1 load on 6/17/2025.  Patient was subsequently started on broad-spectrum antibiotics for concern of underlying infection.  TTE on 6/15/2025 without vegetation or thrombus, or any valvular or regional wall abnormality.  Blood culture 6/13 and 6/16 no growth to date.  Patient was started on IV ceftriaxone 6/14/2025 to 6/16/2025 for possible UTI given bilateral perinephric stranding seen on CT abdomen pelvis, but without improvement was brought into IV vancomycin and IV Zosyn.  Patient additionally underwent LP with bland fluid analysis, WBC 1, RBC 1, protein 72, glucose 73, total of 14 cells were seen, of which 13 were monocytes and 1 more PMN.  Patient also developed acute hypoxic respiratory failure prior to transfer with chest x-ray at outside facility showing small lung volumes, otherwise unremarkable.  Patient was transferred to [facility] for higher level of care, including EEG and neurology consultation.  At [facility], CT head without contrast 6/13/2025 without acute abnormality. CT head without contrast 6/18/2025 without acute abnormality.  MRI brain without contrast 6/13/2025 without acute abnormality, old left basilar ganglia lacunar infarct.  CTA head  and neck 6/13/2025 without acute abnormality.  CT abdomen pelvis without contrast 6/14/2025 with bilateral perinephric soft tissue stranding, diverticulosis of the sigmoid colon, small fat-containing left inguinal hernia. LP on 6/17/2025 with CSF clearing colorless, 1 WBC, 1 RBC, 72 protein (high), 73 glucose (high). COVID/flu/RSV negative. Syphilis nonreactive. HIV nonreactive. UDS negative, alcohol negative, salicylate negative, Tylenol negative. UA 6/13/2025 with 30 protein, negative nitrite, negative leukocyte esterase, WBC 0-2, RBC 16-25, rare bacteria. TSH 1.499, B12 1159, folate 32.5.  On admission, WBC 10.8, hemoglobin 10.2, , sodium 149, potassium 3.7, bicarb 20, anion gap 12, BUN 27, creatinine 0.95, corrected calcium 9.2, CPK 94, ammonia 24, lactic acid 1.1, B12 1164, TSH 1.17, CRP 11.1, prolactin 14.5, .  Chest x-ray without acute abnormality.  The patient was brought to the ICU secondary to worsening mental status, there was concern about airway patency and aspiration risk, the patient therefore transferred, neurology was consulted, EEG was performed, there was no seizure activity,  CT of the head revealed the old lacunar stroke, and LP at the outside facility reported negative    Interval Problem Update  Patient seen and examined today.  Data, Medication data reviewed.  Case discussed with nursing as available.  Plan of Care reviewed with patient and notified of changes.  6/22 the patient is lethargic, has a feeding tube in place, he wakes up and is attempting to converse, mumbles,  Positive cough, positive reflexes, protecting airway currently, does have a risk of aspiration,  Respiratory viral panel negative, blood cultures negative, C. difficile negative, the patient does have a thigh rash, started on acyclovir as it is indicative of acute zoster  MRI of the brain was negative for acute changes  Afebrile, heart rate in the 60s to 80s, respiration unlabored, the patient is in the 140s  to 150s over 60s  Cultures remain negative, procalcitonin low  Neurology evaluated and did not have definitive answers to the patient's change in status  I have discussed this patient's plan of care and discharge plan at IDT rounds today with Case Management, Nursing, Nursing leadership, and other members of the IDT team.    Consultants/Specialty  neurology  Critical care    Code Status  DNAR/DNI    Disposition  The patient is not medically cleared for discharge to home or a post-acute facility.  Anticipate discharge to: skilled nursing facility    I have placed the appropriate orders for post-discharge needs.    Review of Systems  Review of Systems   Unable to perform ROS: Mental acuity        Physical Exam  Temp:  [36.2 °C (97.1 °F)-36.7 °C (98 °F)] 36.6 °C (97.8 °F)  Pulse:  [69-98] 69  Resp:  [10-29] 13  BP: (140-209)/() 142/63  SpO2:  [91 %-99 %] 95 %    Physical Exam  Constitutional:       General: He is not in acute distress.     Appearance: He is obese. He is ill-appearing.      Comments: Lethargic, confused, weak   HENT:      Head: Normocephalic and atraumatic.      Right Ear: External ear normal.      Left Ear: External ear normal.      Nose:      Comments: Small bore feeding tube     Mouth/Throat:      Pharynx: No oropharyngeal exudate or posterior oropharyngeal erythema.   Eyes:      Extraocular Movements: Extraocular movements intact.      Pupils: Pupils are equal, round, and reactive to light.   Cardiovascular:      Rate and Rhythm: Normal rate and regular rhythm.      Pulses: Normal pulses.      Heart sounds: Normal heart sounds.   Pulmonary:      Effort: Pulmonary effort is normal. No respiratory distress.      Breath sounds: Rhonchi and rales present. No wheezing.   Abdominal:      General: Bowel sounds are normal. There is no distension.      Palpations: Abdomen is soft.      Tenderness: There is no abdominal tenderness. There is no guarding.      Comments: Protuberant abdomen, nontender    Musculoskeletal:         General: Swelling present. No tenderness.      Cervical back: Normal range of motion and neck supple.   Skin:     General: Skin is warm and dry.      Comments: Vesicular rash on thigh   Neurological:      Comments: Lethargic, confused, appears nonfocal   Psychiatric:      Comments: Unable to obtain         Fluids    Intake/Output Summary (Last 24 hours) at 6/22/2025 1145  Last data filed at 6/22/2025 1000  Gross per 24 hour   Intake 2562.92 ml   Output 1750 ml   Net 812.92 ml        Laboratory  Recent Labs     06/20/25  0301 06/21/25  1020 06/22/25  0500   WBC 9.6 9.3 9.1   RBC 3.25* 3.13* 2.87*   HEMOGLOBIN 10.4* 10.1* 8.9*   HEMATOCRIT 32.2* 30.4* 28.0*   MCV 99.1* 97.1 97.6   MCH 32.0 32.3 31.0   MCHC 32.3 33.2 31.8*   RDW 47.0 45.9 44.5   PLATELETCT 166 216 212   MPV 9.7 9.3 9.3     Recent Labs     06/20/25  0301 06/21/25  1020 06/22/25  0500   SODIUM 146* 144 143   POTASSIUM 3.6 3.2* 3.2*   CHLORIDE 117* 107 106   CO2 21 27 28   GLUCOSE 148* 122* 106*   BUN 22 16 15   CREATININE 0.83 0.87 0.81   CALCIUM 8.2* 8.5 8.2*                   Imaging  DX-ABDOMEN FOR TUBE PLACEMENT   Final Result      NG tube tip projects at the stomach.      MR-BRAIN-WITH & W/O   Final Result      1.  Age-related cerebral atrophy.   2.  Minimal periventricular white matter changes consistent with chronic microvascular ischemic gliosis.   3.  Small neuroepithelial cyst or chronic area of lacunar infarction in the left subinsular cortex.      DX-ABDOMEN FOR TUBE PLACEMENT   Final Result         1.  Nonspecific bowel gas pattern in the upper abdomen.   2.  Nasogastric tube is coiled within the stomach, the tip terminates overlying the expected location of the gastric cardia.      DX-CHEST-PORTABLE (1 VIEW)   Final Result         1.  Hazy left lower lobe infiltrates.   2.  Cardiomegaly   3.  Atherosclerosis      DX-ABDOMEN FOR TUBE PLACEMENT   Final Result      Gastric tube terminates in the proximal stomach.       DX-CHEST-PORTABLE (1 VIEW)   Final Result         1.  No acute cardiopulmonary disease.   2.  Cardiomegaly   3.  Atherosclerosis      IR-US GUIDED PIV    (Results Pending)        Assessment/Plan  * Encephalopathy- (present on admission)  Assessment & Plan  Negative extensive workup including LP, MRI,  Neurology consulted, no further suggestions  Ongoing supportive care    Oropharyngeal dysphagia  Assessment & Plan  Feeding tube in place  SLP    Herpes zoster without complication  Assessment & Plan  Lesions consistent with zoster, prescribed antiviral regimen    Acute hypoxic respiratory failure (HCC)- (present on admission)  Assessment & Plan  Patient requiring 4 L nasal cannula on admission.  Negative workup, the patient has had aspiration risk  -RT protocol  -Supplemental O2 as needed  -Wean oxygen as tolerated  -Incentive spirometry    Hyponatremia- (present on admission)  Assessment & Plan  Resolved    Macrocytic anemia- (present on admission)  Assessment & Plan  Monitor, transfuse as indicated    Plan  6/22  Still puzzling case, no clear explanation of the patient's encephalopathy, status change  Continue empiric antibiotics,  Initiated antiviral therapy for zoster  Maintain adequate blood pressure control  PT OT SLP  Allow additional time for hopeful clearing of the patient's mental status  Significantly elevated segmentation rate  Query inflammatory condition, will add additional markers  Consider steroids  Close laboratory follow-up  See orders  Patient is has a high medical complexity, complex decision making and is at high risk for complication, morbidity, and mortality.  I spent 58 minutes, reviewing the chart, obtaining and/or reviewing separately obtained history. Performing a medically appropriate examination and evaluation.  Counseling and educating the patient. Ordering and reviewing medications, tests, or procedures.   Documenting clinical information in EPIC. Independently interpreting results  and communicating results to patient. Discussing future disposition of care with patient, RN and case management.      VTE prophylaxis: Lovenox    I have performed a physical exam and reviewed and updated ROS and Plan today (6/22/2025). In review of yesterday's note (6/21/2025), there are no changes except as documented above.      Please note that this dictation was created using voice recognition software. I have made every reasonable attempt to correct obvious errors, but I expect that there are errors of grammar and possibly context that I did not discover before finalizing the note.

## 2025-06-22 NOTE — PROGRESS NOTES
"Pulmonary & Critical Care Progress Note    Date of admission  6/19/2025    Chief Complaint  84 y.o. male admitted 6/19/2025 with altered mental status    Hospital Course  \"84y M hx of Chol, HTN, BPH, depression, limited alcohol use per my discussion with wife, that was transferred from [facility] 6/13/2025 for encephalopathy. He was found to have remote left basal ganglia lacunar infarct, underwent LP which was negative and was transferred here for EEG evaluation. ON my review of CT head has significant atrophy. Tonight I was called since he has worsening GCS and worsening airway reflexes needing NT suction. His vital have been stable, no fever, on 4l n/c, he has normal ammonia, TSH, LP reviewed, high dose thiamine. Neurology consulting, EEG thus far with no seizures. I called his wife tonight she describes that the last quite of few months he has been having slow decline but most significant 1 week prior he was able to pay bills, and put out his pills. They have never had advance care planning or discussion of what he would want and she thinks that due to the fact that his father never wanted to talk about it and that he would live for ever. He currently is full code.      Exam:elderly male seen on T720 no acute distress resting with EEG leads on. He with stimulation opens his eyes up and looks at you is nonverbal except and grunt has a cough and gag reflexes, lungs clear to auscultation no wheezing or ronchi or respiratory distress, skin warm with out pallor or mottling. \" Dr. Martin    Interval Problem Update  ICU CHECKLIST:  Chart review from the past 24 hours includes imaging, laboratory studies, vital signs and notes available.  Pertinent system review for today's visit includes:  Significant overnight events:     Neuro: CT head with old lacunar stroke. LP at OSH negative.  Patient is responsive only to noxious and repetitive physical stimuli.  He occasionally says words in response to questions but " does not focus and is not conversational.  Protecting airway currently although high risk for aspiration.    Cardiac: HR 60-90, -170/70-80  Pulmonary: 2L 95%   Heme: Anemia, iron deficiency    DVT Prophylaxis: Lovenox  /Renal: WNL   I/O: 2373/1975 = +398 = +221   Escobar: No  ID: Respiratory Viral Panel negative, blood cultures negative, c diff negative, wound culture NGTD    Skin: Thigh rash - VZV  GI/Nutrition: Prealbumin 7.0    Prophylaxis: Home Prevacid  Endo: Glucose 122-148  Additional data:   Lines/Tubes: OGT      Review of Systems  Review of Systems   Unable to perform ROS: Mental status change        Vital Signs for last 24 hours   Temp:  [36.2 °C (97.1 °F)-36.7 °C (98.1 °F)] 36.4 °C (97.6 °F)  Pulse:  [69-98] 79  Resp:  [10-29] 17  BP: (140-209)/() 177/74  SpO2:  [91 %-99 %] 95 %    Hemodynamic parameters for last 24 hours       Respiratory Information for the last 24 hours       Physical Exam   Physical Exam  Constitutional:       Appearance: Normal appearance.   HENT:      Head: Atraumatic.   Cardiovascular:      Rate and Rhythm: Normal rate and regular rhythm.   Pulmonary:      Effort: Pulmonary effort is normal. No respiratory distress.      Breath sounds: Rhonchi present. No wheezing or rales.   Abdominal:      General: Abdomen is flat.   Musculoskeletal:         General: No swelling.   Skin:     General: Skin is warm and dry.      Comments: Clustered erythematous rash, no active blistering but appears like healing VZV    Neurological:      Mental Status: He is alert.      Comments: Disoriented, intermittently will verbally respond to simple questions, not following commands.         Medications  Current Medications[1]    Fluids    Intake/Output Summary (Last 24 hours) at 6/22/2025 0711  Last data filed at 6/22/2025 0600  Gross per 24 hour   Intake 2373.25 ml   Output 1975 ml   Net 398.25 ml       Laboratory            Recent Labs     06/20/25  0301 06/21/25  1020 06/22/25  0500   SODIUM  146* 144 143   POTASSIUM 3.6 3.2* 3.2*   CHLORIDE 117* 107 106   CO2 21 27 28   BUN 22 16 15   CREATININE 0.83 0.87 0.81   MAGNESIUM  --  2.0  --    PHOSPHORUS  --  2.5  --    CALCIUM 8.2* 8.5 8.2*     Recent Labs     06/20/25  0301 06/21/25  1020 06/22/25  0500   ALTSGPT 12 13  --    ASTSGOT 25 28  --    ALKPHOSPHAT 60 58  --    TBILIRUBIN 0.4 0.5  --    PREALBUMIN 7.0*  --   --    GLUCOSE 148* 122* 106*     Recent Labs     06/20/25  0301 06/21/25  1020 06/22/25  0500   WBC 9.6 9.3 9.1   ASTSGOT 25 28  --    ALTSGPT 12 13  --    ALKPHOSPHAT 60 58  --    TBILIRUBIN 0.4 0.5  --      Recent Labs     06/20/25  0301 06/21/25  1020 06/22/25  0500   RBC 3.25* 3.13* 2.87*   HEMOGLOBIN 10.4* 10.1* 8.9*   HEMATOCRIT 32.2* 30.4* 28.0*   PLATELETCT 166 216 212       Imaging  X-Ray:  I have personally reviewed the images and compared with prior images.  CT:    Reviewed    Assessment/Plan  #Encephalopathy - Current working diagnoses are vascular, lewy body and Alzheimer's dementia vs. VZV encephalitis  - Extensive workup has been negative for reversible causes  - MRI ordered but will very likely not change the plan of care  - LP done, repeat LP discontinued after review of imaging as unlikely to be revealing in he setting of high suspicion for dementia.  #Remote left Basal Ganglia infarct  - LP: WBC 1, RBC 1, protein 72, glucose 73, total of 14 cells were seen, of which 13 were monocytes and 1 more PMN   Plan:  - Continue airway monitoring in ICU   - Continue GOC discussions  - Neurology following  - EEG - negative  - Seizure and aspiration precautions  - Continue thiamine  - On empiric abx  - Neurochecks Q4H  - Holding Keppra  - If able to safely obtain MRI will proceed, however unlikely to change plan of care  - Empiric treatment with acyclovir despite negative LP given rash on thigh, discussed with neuro - 10mg/kg x 14 days     #Clustered Erythematous Rash on Right thigh   Plan:  - No active blistering  - Empiric treatment  with acyclovir x 14 days    #Acute Hypoxic Respiratory Failure  Plan:  - HOB elevation  - Aspiration precautions  - Titrate oxygen to an SpO2 of 88-94%  - Airway clearance as needed  - Avoid further sedative medications    #History of Hypertension  Plan:  - Continue home meds - Amlodipine, HCTZ and losartan    #History of Hyperlipidemia  Plan:  - Continue home statin: atorvastatin    #Depression    #Reported alcohol abuse disorder - drinks 1-2 alcoholic beverages  Plan:  - Without impression Etoh history    #History of BPH  Plan:  - Continue home flomax    VTE:  Lovenox  Ulcer: PPI  Lines: None    I have performed a physical exam and reviewed and updated ROS and Plan today (6/22/2025). In review of yesterday's note (6/21/2025), there are no changes except as documented above.     Discussed patient condition and risk of morbidity and/or mortality with RN, RT, Pharmacy, and   The patient remains critically ill.  Critical care time = 35 minutes in directly providing and coordinating critical care and extensive data review.  No time overlap and excludes procedures.    Etta Figueroa MD RD  Pulmonary and Critical Care    Available on Voalte         [1]   Current Facility-Administered Medications   Medication Dose Route Frequency Provider Last Rate Last Admin    albuterol (Proventil) 2.5mg/0.5ml nebulizer solution 2.5 mg  2.5 mg Nebulization Q4H PRN (RT) Edgard Martin M.D.        sodium chloride 3% nebulizer solution 3 mL  3 mL Nebulization 4X/DAY (RT) Edgard Maritn M.D.   3 mL at 06/21/25 1927    ipratropium-albuterol (DUONEB) nebulizer solution  3 mL Nebulization 4X/DAY (RT) Edgard Martin M.D.   3 mL at 06/21/25 1926    lactated ringers infusion   Intravenous Continuous Edgard Martin M.D. 75 mL/hr at 06/21/25 1734 New Bag at 06/21/25 1734    Pharmacy Consult Request  1 Each Other PHARMACY TO DOSE Radha CHO Erinchacho        [Held by provider] finasteride (Proscar) tablet 5 mg  5 mg Oral DAILY  Barry Whyte M.D.        [Held by provider] tamsulosin (Flomax) capsule 0.4 mg  0.4 mg Oral BID Barry Whyte M.D.        lansoprazole (Prevacid) solutab 30 mg  30 mg Enteral Tube DAILY Barry Whyte M.D.   30 mg at 06/22/25 0534    amLODIPine (Norvasc) tablet 10 mg  10 mg Enteral Tube DAILY Barry Whyte M.D.   10 mg at 06/22/25 0535    atorvastatin (Lipitor) tablet 20 mg  20 mg Enteral Tube DAILY Barry Whyte M.D.   20 mg at 06/22/25 0535    losartan (Cozaar) tablet 100 mg  100 mg Enteral Tube DAILY Barry Whyte M.D.   100 mg at 06/22/25 0534    hydroCHLOROthiazide tablet 25 mg  25 mg Oral DAILY Barry Whyte M.D.   25 mg at 06/22/25 0535    Respiratory Therapy Consult   Nebulization Continuous RT Russell Lawrence M.D.        enoxaparin (Lovenox) inj 40 mg  40 mg Subcutaneous DAILY AT 1800 Russell Lawrence M.D.   40 mg at 06/21/25 1731    hydrALAZINE (Apresoline) injection 10 mg  10 mg Intravenous Q4HRS PRN Russell Lawrence M.D.   10 mg at 06/21/25 1816    piperacillin-tazobactam (Zosyn) 4.5 g in  mL IVPB  4.5 g Intravenous Q8HRS Russell Lawrence M.D. 25 mL/hr at 06/22/25 0534 4.5 g at 06/22/25 0534    Pharmacy Consult: Enteral tube insertion - review meds/change route/product selection  1 Each Other PHARMACY TO DOSE Barry Whyte M.D.        acetaminophen (Tylenol) tablet 650 mg  650 mg Enteral Tube Q6HRS PRN Barry Whyte M.D.        ondansetron (Zofran) syringe/vial injection 4 mg  4 mg Intravenous Q4HRS PRN Barry Whyte M.D.        Or    ondansetron (Zofran ODT) dispertab 4 mg  4 mg Enteral Tube Q4HRS PRN Barry Whyte M.D.

## 2025-06-23 ENCOUNTER — APPOINTMENT (OUTPATIENT)
Dept: RADIOLOGY | Facility: MEDICAL CENTER | Age: 85
DRG: 070 | End: 2025-06-23
Payer: MEDICARE

## 2025-06-23 VITALS
OXYGEN SATURATION: 92 % | SYSTOLIC BLOOD PRESSURE: 148 MMHG | HEART RATE: 61 BPM | RESPIRATION RATE: 18 BRPM | WEIGHT: 191.58 LBS | HEIGHT: 62 IN | TEMPERATURE: 97.7 F | DIASTOLIC BLOOD PRESSURE: 62 MMHG | BODY MASS INDEX: 35.25 KG/M2

## 2025-06-23 LAB
ALBUMIN SERPL BCP-MCNC: 2.7 G/DL (ref 3.2–4.9)
ALBUMIN/GLOB SERPL: 0.9 G/DL
ALP SERPL-CCNC: 59 U/L (ref 30–99)
ALT SERPL-CCNC: 13 U/L (ref 2–50)
ANION GAP SERPL CALC-SCNC: 8 MMOL/L (ref 7–16)
AST SERPL-CCNC: 25 U/L (ref 12–45)
BASOPHILS # BLD AUTO: 0.7 % (ref 0–1.8)
BASOPHILS # BLD: 0.07 K/UL (ref 0–0.12)
BILIRUB SERPL-MCNC: 0.4 MG/DL (ref 0.1–1.5)
BUN SERPL-MCNC: 14 MG/DL (ref 8–22)
CALCIUM ALBUM COR SERPL-MCNC: 9.4 MG/DL (ref 8.5–10.5)
CALCIUM SERPL-MCNC: 8.4 MG/DL (ref 8.5–10.5)
CHLORIDE SERPL-SCNC: 101 MMOL/L (ref 96–112)
CO2 SERPL-SCNC: 31 MMOL/L (ref 20–33)
CREAT SERPL-MCNC: 0.78 MG/DL (ref 0.5–1.4)
CRP SERPL HS-MCNC: 7.27 MG/DL (ref 0–0.75)
EOSINOPHIL # BLD AUTO: 0.28 K/UL (ref 0–0.51)
EOSINOPHIL NFR BLD: 2.6 % (ref 0–6.9)
ERYTHROCYTE [DISTWIDTH] IN BLOOD BY AUTOMATED COUNT: 43 FL (ref 35.9–50)
GFR SERPLBLD CREATININE-BSD FMLA CKD-EPI: 87 ML/MIN/1.73 M 2
GLOBULIN SER CALC-MCNC: 3.1 G/DL (ref 1.9–3.5)
GLUCOSE SERPL-MCNC: 154 MG/DL (ref 65–99)
HCT VFR BLD AUTO: 28.9 % (ref 42–52)
HGB BLD-MCNC: 9.3 G/DL (ref 14–18)
IMM GRANULOCYTES # BLD AUTO: 0.06 K/UL (ref 0–0.11)
IMM GRANULOCYTES NFR BLD AUTO: 0.6 % (ref 0–0.9)
LYMPHOCYTES # BLD AUTO: 0.88 K/UL (ref 1–4.8)
LYMPHOCYTES NFR BLD: 8.2 % (ref 22–41)
MAGNESIUM SERPL-MCNC: 1.8 MG/DL (ref 1.5–2.5)
MCH RBC QN AUTO: 30.9 PG (ref 27–33)
MCHC RBC AUTO-ENTMCNC: 32.2 G/DL (ref 32.3–36.5)
MCV RBC AUTO: 96 FL (ref 81.4–97.8)
MONOCYTES # BLD AUTO: 0.88 K/UL (ref 0–0.85)
MONOCYTES NFR BLD AUTO: 8.2 % (ref 0–13.4)
NEUTROPHILS # BLD AUTO: 8.54 K/UL (ref 1.82–7.42)
NEUTROPHILS NFR BLD: 79.7 % (ref 44–72)
NRBC # BLD AUTO: 0 K/UL
NRBC BLD-RTO: 0 /100 WBC (ref 0–0.2)
PHOSPHATE SERPL-MCNC: 2 MG/DL (ref 2.5–4.5)
PLATELET # BLD AUTO: 249 K/UL (ref 164–446)
PMV BLD AUTO: 9.5 FL (ref 9–12.9)
POTASSIUM SERPL-SCNC: 3.2 MMOL/L (ref 3.6–5.5)
PREALB SERPL-MCNC: 6.3 MG/DL (ref 18–38)
PROT SERPL-MCNC: 5.8 G/DL (ref 6–8.2)
RBC # BLD AUTO: 3.01 M/UL (ref 4.7–6.1)
SODIUM SERPL-SCNC: 140 MMOL/L (ref 135–145)
WBC # BLD AUTO: 10.7 K/UL (ref 4.8–10.8)

## 2025-06-23 PROCEDURE — 36415 COLL VENOUS BLD VENIPUNCTURE: CPT

## 2025-06-23 PROCEDURE — 86140 C-REACTIVE PROTEIN: CPT

## 2025-06-23 PROCEDURE — 700105 HCHG RX REV CODE 258: Performed by: STUDENT IN AN ORGANIZED HEALTH CARE EDUCATION/TRAINING PROGRAM

## 2025-06-23 PROCEDURE — 700111 HCHG RX REV CODE 636 W/ 250 OVERRIDE (IP): Mod: JZ | Performed by: INTERNAL MEDICINE

## 2025-06-23 PROCEDURE — 92610 EVALUATE SWALLOWING FUNCTION: CPT

## 2025-06-23 PROCEDURE — 700101 HCHG RX REV CODE 250: Performed by: STUDENT IN AN ORGANIZED HEALTH CARE EDUCATION/TRAINING PROGRAM

## 2025-06-23 PROCEDURE — A9270 NON-COVERED ITEM OR SERVICE: HCPCS | Performed by: STUDENT IN AN ORGANIZED HEALTH CARE EDUCATION/TRAINING PROGRAM

## 2025-06-23 PROCEDURE — 80053 COMPREHEN METABOLIC PANEL: CPT

## 2025-06-23 PROCEDURE — 85025 COMPLETE CBC W/AUTO DIFF WBC: CPT

## 2025-06-23 PROCEDURE — 84100 ASSAY OF PHOSPHORUS: CPT

## 2025-06-23 PROCEDURE — 84134 ASSAY OF PREALBUMIN: CPT

## 2025-06-23 PROCEDURE — 700111 HCHG RX REV CODE 636 W/ 250 OVERRIDE (IP): Mod: JZ | Performed by: STUDENT IN AN ORGANIZED HEALTH CARE EDUCATION/TRAINING PROGRAM

## 2025-06-23 PROCEDURE — 302307 BAG FECAL MANAGEMENT TEMPORARY COLLECTION WITH FILTER - SEAL SIGNAL FMS: Performed by: HOSPITALIST

## 2025-06-23 PROCEDURE — 700105 HCHG RX REV CODE 258: Performed by: INTERNAL MEDICINE

## 2025-06-23 PROCEDURE — 99239 HOSP IP/OBS DSCHRG MGMT >30: CPT | Performed by: STUDENT IN AN ORGANIZED HEALTH CARE EDUCATION/TRAINING PROGRAM

## 2025-06-23 PROCEDURE — 83735 ASSAY OF MAGNESIUM: CPT

## 2025-06-23 PROCEDURE — 700102 HCHG RX REV CODE 250 W/ 637 OVERRIDE(OP): Performed by: STUDENT IN AN ORGANIZED HEALTH CARE EDUCATION/TRAINING PROGRAM

## 2025-06-23 RX ORDER — LANSOPRAZOLE 30 MG/1
30 TABLET, ORALLY DISINTEGRATING, DELAYED RELEASE ORAL DAILY
Status: SHIPPED
Start: 2025-06-24

## 2025-06-23 RX ORDER — HYDROCHLOROTHIAZIDE 25 MG/1
25 TABLET ORAL DAILY
Status: DISCONTINUED | OUTPATIENT
Start: 2025-06-24 | End: 2025-06-23 | Stop reason: HOSPADM

## 2025-06-23 RX ADMIN — LANSOPRAZOLE 30 MG: 30 TABLET, ORALLY DISINTEGRATING ORAL at 04:24

## 2025-06-23 RX ADMIN — PIPERACILLIN AND TAZOBACTAM 4.5 G: 4; .5 INJECTION, POWDER, FOR SOLUTION INTRAVENOUS at 04:33

## 2025-06-23 RX ADMIN — ACYCLOVIR SODIUM 550 MG: 500 INJECTION, SOLUTION INTRAVENOUS at 14:44

## 2025-06-23 RX ADMIN — HYDROCHLOROTHIAZIDE 25 MG: 25 TABLET ORAL at 04:24

## 2025-06-23 RX ADMIN — POTASSIUM PHOSPHATE, MONOBASIC POTASSIUM PHOSPHATE, DIBASIC INJECTION, 30 MMOL: 236; 224 SOLUTION, CONCENTRATE INTRAVENOUS at 16:10

## 2025-06-23 RX ADMIN — ACYCLOVIR SODIUM 550 MG: 500 INJECTION, SOLUTION INTRAVENOUS at 06:24

## 2025-06-23 RX ADMIN — LOSARTAN POTASSIUM 100 MG: 50 TABLET, FILM COATED ORAL at 04:24

## 2025-06-23 RX ADMIN — PIPERACILLIN AND TAZOBACTAM 4.5 G: 4; .5 INJECTION, POWDER, FOR SOLUTION INTRAVENOUS at 13:38

## 2025-06-23 RX ADMIN — ENOXAPARIN SODIUM 40 MG: 100 INJECTION SUBCUTANEOUS at 16:10

## 2025-06-23 RX ADMIN — AMLODIPINE BESYLATE 10 MG: 10 TABLET ORAL at 04:24

## 2025-06-23 RX ADMIN — ATORVASTATIN CALCIUM 20 MG: 20 TABLET, FILM COATED ORAL at 04:24

## 2025-06-23 ASSESSMENT — PAIN DESCRIPTION - PAIN TYPE: TYPE: ACUTE PAIN

## 2025-06-23 NOTE — CARE PLAN
The patient is Stable - Low risk of patient condition declining or worsening    Shift Goals  Clinical Goals: Maintain neuro status, skin maintenance  Patient Goals: Sleep and drink water  Family Goals: aaron    Progress made toward(s) clinical / shift goals:    Problem: Knowledge Deficit - Standard  Goal: Patient and family/care givers will demonstrate understanding of plan of care, disease process/condition, diagnostic tests and medications  Outcome: Progressing     Problem: Skin Integrity  Goal: Skin integrity is maintained or improved  Outcome: Progressing     Problem: Safety  Goal: Will remain free from injury  Outcome: Progressing       Patient is not progressing towards the following goals:

## 2025-06-23 NOTE — DIETARY
Nutrition Services Brief Update:    Problem: Nutritional:  Goal: Nutrition support tolerated and meeting greater than 85% of estimated needs  Outcome: MET    Pt is receiving TF Promote with Fiber at goal rate 60 ml/hr. Labs and meds reviewed. K 3.2, Phos 2.0. Recommended repletion of electrolytes with MD. SPARKS following.

## 2025-06-23 NOTE — PROGRESS NOTES
Monitor Summary: SR 70-93, [place] 0.16, QRS 0.08, QT 0.37, with rare PVC per strip from monitor room.

## 2025-06-23 NOTE — PROGRESS NOTES
4 Eyes Skin Assessment Completed by Lobito MUNOZ RN and Nathan CHAPMAN RN.    Skin assessment is primarily focused on high risk bony prominences. Pay special attention to skin beneath and around medical devices, high risk bony prominences, skin to skin areas and areas where the patient lacks sensation to feel pain and areas where the patient previously had breakdown.     Head (Occipital):  WDL   Ears (Under Medical Devices): WDL   Nose (Under Medical Devices): WDL   Mouth:  WDL   Neck: WDL   Breast/Chest:  WDL   Shoulder Blades:  WDL   Spine:   Bruising, Edema, and Weeping   (R) Arm/Elbow/Hand: Bruising, Edema, and Weeping   (L) Arm/Elbow/Hand: Edema   Abdomen: Edema   Pannus/Groin:  Red   Sacrum/Coccyx:   Red   (R) Ischial Tuberosity (Sit Bones):  WDL   (L) Ischial Tuberosity (Sit Bones):  WDL   (R) Leg:  WDL   (L) Leg:  WDL   (R) Heel:  Boggy   (R) Foot/Toe: WDL   (L) Heel: Boggy   (L) Foot/Toe:  WDL       DEVICES IN USE:   Respiratory Devices:  Nasal cannula  Feeding Devices:  NG tube   Lines & BP Monitoring Devices:  Peripheral IV    Orthopedic Devices:  N/A  Miscellaneous Devices:  Bowel management system and SCDs    PROTOCOL INTERVENTIONS:   Low Airloss Bed:  Already in place  Offloading Dressing - Heel:  Already in place  Heel Float Boots:  Already in place  Q2 Turns with Pillows:  Already in place  Condom Cath/Purewick:  Already in place  Bowel Management System:  Already in place  Nasal Cannula with Gray Foams:  Already in place    WOUND PHOTOS:   N/A no wounds identified    WOUND CONSULT:   N/A, no advanced wound care needs identified

## 2025-06-23 NOTE — THERAPY
"Speech Language Pathology   Clinical Swallow Evaluation     Patient Name:  Kishor Nash  AGE:  84 y.o., SEX:  male  Medical Record #:  [id number]  Date of Service:  2025    Precautions  Medical: Altered Mentation, Fall Risk  Swallowing: NPO, NG Tube    History of Present Illness  83 y/o male presented to OSH on  with acute encephalopathy. Admitted with hypokalemia, KAYLIE. Transferred to [facility]  for neurology consult and EEG after persistent encephalopathy and development of acute respiratory failure.    CMHx: Encephalopathy, acute respiratory failure, hyponatremia  PMHx: BPH, HLD, HTN, depression, alcohol use. No hx SLP in [facility].     MRI Brain :  \"1.  Age-related cerebral atrophy.  2.  Minimal periventricular white matter changes consistent with chronic microvascular ischemic gliosis.  3.  Small neuroepithelial cyst or chronic area of lacunar infarction in the left subinsular cortex.\"    CXR :  \"1.  Hazy left lower lobe infiltrates.  2.  Cardiomegaly  3.  Atherosclerosis\"      General Information:  Vitals  Pulse Oximetry: 93 %  O2 (LPM): 2  O2 Delivery Device: Silicone Nasal Cannula  Level of Consciousness: Awake, Drowsy  Patient Behaviors: Confused, Fatigue  Orientation: Self, . Stated he is in a hospital in [place]   Follows Directives: Inconsistent    Prior Living Situation & Level of Function:  Communication: Unclear PLOF  Swallowing: Per patient report, RG/TN without complication     Oral Mechanism Evaluation:  Dentition: Fair, Natural dentition, Upper partial, Lower partial, Denture(s) not available at time of evaluation   Facial Symmetry: Equal  Facial Sensation: Pt did not follow commands to assess     Labial Observations: Open mouth posture   Lingual Observations: Midline, Xerostomia  Motor Speech: Grossly WFL; limited assessment    Laryngeal Function:  Secretion Management: Adequate  Voice Quality: Hoarse  Cough: Perceptually weak      Subjective  Pt agreeable and cooperative with SLP " "evaluation tasks. Limited by fatigue but motivated by PO.      Assessment  Current Method of Nutrition: NPO until cleared by speech pathology, NGT  Positioning: Semi-Sharp's (30-45 degrees)  Bolus Administration: SLP  O2 (LPM): 2 O2 Delivery Device: Silicone Nasal Cannula  Factor(s) Affecting Performance: Impaired endurance, Impaired mental status  Tracheostomy : No    Swallowing Trials:  Ice: Impaired  Thin Liquid (TN0): Impaired      Comments:   Idiosyncratic bolus manipulation with ice chips. Inconsistent attempts to coordinate labial seal and intra-oral for straw suction.  Congested, non-productive cough response noted after straw sip TN0. Pt then expressed fatigue and declined further trials.       Clinical Impressions  Pt presents with clinical indicators of and is at elevated risk for oropharyngeal dysphagia given persistent encephalopathy with prolonged NPO status. Pt would benefit from further evaluation of swallow using FEES prior to meaningful initiation of PO; however, at this time, patient's ability to participate in further evaluation is limited by fatigue and mentation. SLP to follow to determine readiness for instrumentation.        Recommendations  NPO / NGT   - Ice chips when awake and after oral care   - Careful hand feeding with ice chips  Instrumentation: Anticipate FEES in coming days should mentation / alertness continue to improve  Medication: Non Oral  Oral Care: Q2h       SLP Treatment Plan  Treatment Plan: Dysphagia Treatment, Patient/Family/Caregiver Training  SLP Frequency: 4x Per Week  Estimated Duration: Until Therapy Goals Met    Anticipated Discharge Needs  Discharge Recommendations: Recommend post-acute placement for additional speech therapy services prior to discharge home   Therapy Recommendations Upon DC: Dysphagia Training, Patient / Family / Caregiver Education, Community Re-Integration      Patient / Family Goals  Patient / Family Goal #1: \"Let's do more ice.\"  Short Term " Goals  Short Term Goal # 1: Pt will participate in prfdg to determine readiness for diagnostic evaluation vs. PO diet.    Ailyn Alvarez, SLP

## 2025-06-23 NOTE — DISCHARGE PLANNING
Case Management Discharge Planning    Admission Date: 6/19/2025  GMLOS: 4.6  ALOS: 4    6-Clicks ADL Score: 6  6-Clicks Mobility Score: 6  PT and/or OT Eval ordered: Yes  Post-acute Referrals Ordered: No  Post-acute Choice Obtained: No  Has referral(s) been sent to post-acute provider:  No      Anticipated Discharge Dispo: Discharge Disposition: D/T to short term gen hosp for IP care (02)    DME Needed: No    Action(s) Taken: Chart review completed and discussed pt in IDT rounds. Care team notified pt has Transfer Back Agreement with [facility].    Escalations Completed: None    Medically Clear: No    Next Steps: Care coordination will f/u to assist with TB    Barriers to Discharge: Medical clearance and Pending Placement    Is the patient up for discharge tomorrow: No

## 2025-06-23 NOTE — CARE PLAN
The patient is Watcher - Medium risk of patient condition declining or worsening    Shift Goals  Clinical Goals: monitor neuro status, safety, maintain skin integrity  Patient Goals: rest  Family Goals: aaron    Progress made toward(s) clinical / shift goals:    Problem: Safety  Goal: Will remain free from injury  Outcome: Not Progressing  Note: Patient removing peripheral IV lines, NG tube, condom cath. Patient confused.      Problem: Knowledge Deficit - Standard  Goal: Patient and family/care givers will demonstrate understanding of plan of care, disease process/condition, diagnostic tests and medications  Description: Target End Date:  1-3 days or as soon as patient condition allows    Document in Patient Education    1.  Patient and family/caregiver oriented to unit, equipment, visitation policy and means for communicating concern  2.  Complete/review Learning Assessment  3.  Assess knowledge level of disease process/condition, treatment plan, diagnostic tests and medications  4.  Explain disease process/condition, treatment plan, diagnostic tests and medications  Outcome: Progressing  Note: Patient aox2. Confused. Patient needs teaching reinforcement on plan of care.      Problem: Fall Risk  Goal: Patient will remain free from falls  Description: Target End Date:  Prior to discharge or change in level of care    Document interventions on the Mcdaniel Stu Fall Risk Assessment    1.  Assess for fall risk factors  2.  Implement fall precautions  Outcome: Progressing  Note: Bed alarm in place. Patient confused unable to call appropriately.      Problem: Pain - Standard  Goal: Alleviation of pain or a reduction in pain to the patient’s comfort goal  Description: Target End Date:  Prior to discharge or change in level of care    Document on Vitals flowsheet    1.  Document pain using the appropriate pain scale per order or unit policy  2.  Educate and implement non-pharmacologic comfort measures (i.e. relaxation,  distraction, massage, cold/heat therapy, etc.)  3.  Pain management medications as ordered  4.  Reassess pain after pain med administration per policy  5.  If opiods administered assess patient's response to pain medication is appropriate per POSS sedation scale  6.  Follow pain management plan developed in collaboration with patient and interdisciplinary team (including palliative care or pain specialists if applicable)  Outcome: Progressing  Note: Patient denies pain.     Problem: Skin Integrity  Goal: Skin integrity is maintained or improved  Description: Target End Date:  Prior to discharge or change in level of care    Document interventions on Skin Risk/Vega flowsheet groups and corresponding LDA    1.  Assess and monitor skin integrity, appearance and/or temperature  2.  Assess risk factors for impaired skin integrity and/or pressures ulcers  3.  Implement precautions to protect skin integrity in collaboration with interdisciplinary team  4.  Implement pressure ulcer prevention protocol if at risk for skin breakdown  5.  Confirm wound care consult if at risk for skin breakdown  6.  Ensure patient use of pressure relieving devices  (Low air loss bed, waffle overlay, heel protectors, ROHO cushion, etc)  Outcome: Progressing  Note: Pt turned and repositioned Q2H Barrier cream used to alleviate skin breakdown on delicate perineal areas. Mepilex placed on bony prominences to prevent skin breakdown. NANNETTE bed in place. Heels floated on pillows. BMS in place. Condom cath in place.      Problem: Respiratory:  Goal: Respiratory status will improve  Outcome: Progressing  Note: Patient on continuous pulse ox. 2L @ 93%     Problem: Bowel/Gastric:  Goal: Normal bowel function is maintained or improved  Outcome: Progressing  Note: IRIS placed due to patient removal of previous NG tube. Hospitalist aware. Speech evaluation pending.      Problem: Urinary Elimination:  Goal: Ability to reestablish a normal urinary elimination  pattern will improve  Outcome: Progressing  Note: Patient incontinent. Condom cath  in place.        Patient is not progressing towards the following goals:      Problem: Communication  Goal: The ability to communicate needs accurately and effectively will improve  Outcome: Not Progressing     Problem: Safety  Goal: Will remain free from injury  Outcome: Not Progressing  Note: Patient removing peripheral IV lines, NG tube, condom cath. Patient confused.

## 2025-06-23 NOTE — PROGRESS NOTES
Iris Placement    Tube Team verified patient name and medical record number prior to tube placement. Iris feeding tube (43 inches, 12 Tajik) placed at 70 cm in right nare. Per Iris picture, tube appears to be in the stomach.    Nursing Instructions: Await KUB to confirm placement before use for medications or feeding. Stylet, may be removed, please place in labeled bag with insufflation bulb and save in patient medication drawer.

## 2025-06-24 LAB
BACTERIA BLD CULT: NORMAL
BACTERIA BLD CULT: NORMAL
NUCLEAR IGG SER QL IA: NORMAL
SIGNIFICANT IND 70042: NORMAL
SIGNIFICANT IND 70042: NORMAL
SITE SITE: NORMAL
SITE SITE: NORMAL
SOURCE SOURCE: NORMAL
SOURCE SOURCE: NORMAL

## 2025-06-24 NOTE — DISCHARGE INSTRUCTIONS
Discharge Instructions    Discharged to other by medical transportation with escort. Discharged via ambulance, hospital escort: Yes.  Special equipment needed: Not Applicable    Be sure to schedule a follow-up appointment with your primary care doctor or any specialists as instructed.     Discharge Plan:   Diet Plan: Discussed  Activity Level: Discussed  Confirmed Follow up Appointment: Patient to Call and Schedule Appointment  Confirmed Symptoms Management: Discussed  Medication Reconciliation Updated: Yes    I understand that a diet low in cholesterol, fat, and sodium is recommended for good health. Unless I have been given specific instructions below for another diet, I accept this instruction as my diet prescription.   Other diet: PEG tube w/Promote Fiber at 60mL/hr    Special Instructions: None    -Is this patient being discharged with medication to prevent blood clots?  No    Is patient discharged on Warfarin / Coumadin?   No

## 2025-06-24 NOTE — DISCHARGE PLANNING
Patient Name: Kishor Nash  MRN: [id number]  Receiving Facility: [facility]  Receiving Facility Address: [address] [place] [address]  Accepting Physician: Jose J Winchester  Bed Assignment: Room 2.   RN to RN Report Phone #: [phone]  Transport Date: 06/23/2025  Transport Time: First available.   Transport Vendor: YISEL  Reason for transfer: Transfer Back Agreement  [facility] contacted the Film Room to burn a disc of imaging.    completed PCS faxed to [facility] at [phone]  COBRA, D/C summary, notes and criselda in packet.

## 2025-06-24 NOTE — PROGRESS NOTES
Patient discharged with BMS, Iris, IV in place. Report given to WAN lawrence from [facility]. Wife notified of discharge. Hospitalist on call reached out to wife on discharge plan. AVS placed in cobra packet. Patient left with remsa report given.

## 2025-06-24 NOTE — PROGRESS NOTES
Monitor summary: SR 73-84, MA 0.18, QRS 0.08, QT 0.40, with frequent PVCs per strip from monitor room.

## 2025-06-24 NOTE — DISCHARGE SUMMARY
Discharge Summary    CHIEF COMPLAINT ON ADMISSION  No chief complaint on file.      Reason for Admission  Neurology (non-stroke) (concern fo*    Admission Date  6/19/2025     CODE STATUS  DNAR/DNI    HPI & HOSPITAL COURSE  This is a 84y M hx of HTN, BPH, GERD, HLD, depression limited alcohol use, who was transferred from [facility] 6/19/2025 for upper level of care of encephalopathy.     Patient presented to [facility] on 6/13/2025 with acute encephalopathy. CT head showing remote left basal ganglia lacunar infarct.  CTA head and neck without acute abnormality.  Patient was started on IV fluids and high-dose IV thiamine, as well as CIWA protocol.  Teleneurology was consulted recommending LP and continuous EEG to rule out nonconvulsive status epilepticus. MRI brain negative. Received Keppra 2 g IV x 1 load on 6/17/2025.  Patient was subsequently started on broad-spectrum antibiotics for concern of underlying infection.  TTE on 6/15/2025 without vegetation or thrombus, or any valvular or regional wall abnormality.  Blood culture 6/13 and 6/16 no growth to date.  Patient was started on IV ceftriaxone 6/14/2025 to 6/16/2025 for possible UTI given bilateral perinephric stranding seen on CT abdomen pelvis, but without improvement. Abx was switched to iv vancomycin and iv zosyn. Patient additionally underwent LP with bland fluid analysis, WBC 1, RBC 1, protein 72, glucose 73, total of 14 cells were seen, of which 13 were monocytes and 1 more PMN. COVID/flu/RSV negative. Syphilis nonreactive. HIV nonreactive. UDS negative, alcohol negative, salicylate negative, Tylenol negative.  TSH 1.499, B12 1159, folate 32.5. Patient also developed acute hypoxic respiratory failure prior to transfer with chest x-ray at outside facility showing small lung volumes, otherwise unremarkable.     Patient was transferred to [facility] for higher level of care, including EEG and neurology consultation.   [facility] continuous generalized predominantly delta slowing of the background with superimposed faster frequency waveforms. This suggests nonspecific diffuse cerebral dysfunction.  No seizures or epileptiform discharges are observed.      Neurology was consulted, recommends MRI brain with and without contrast which showed no acute changes, but noted cerebral atrophy, chronic microvascular ischemic gliosis  and chronic area of lacunar infarction. Neurology recommends outpatient follow up. 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 He is also noted herpes zoster rash in R thigh. He was placed on empirical treatment with acyclovir despite negative LP at outside hospital given rash on thigh, case was discussed with neurology. He is currently on Acyclovir 550mg (10mg/kg) every 8h for total 2 weeks ending 7/6/2025. He completed empirical antibotics Zosyn for 5 days. Blood culture and respiratory virus panel negative.     Patient required to transfer to ICU due to increased secretions requiring frequent NT suction. Goal of care has been discussed between ICU and patient's family. Eventually they elected DNR/DNI. He did not require intubation. He is currently on Tele, on 2L O2, hemodynamically stable.     Due to encephalopathy, he is currently on NG tube feeding, will need speech evaluation to see if he is able to tolerate oral diet.      On the day of discharge, patient is able to open eyes with voice commands, follow simple commands. He is Aox3. He will need to continue iv Acyclovir and PT/OT/SLP follow up.     Therefore, he is discharged in fair and stable condition to a short-term general hosptial for inpatient care due to transfer back agreement.     The patient met 2-midnight criteria for an inpatient stay at the time of discharge.      FOLLOW UP ITEMS POST DISCHARGE  Neurology as outpatient  PT/OT/SLP    DISCHARGE DIAGNOSES  Principal Problem:     Encephalopathy (POA: Yes)  Active Problems:    Macrocytic anemia (POA: Yes)    Hyponatremia (POA: Yes)    Acute hypoxic respiratory failure (HCC) (POA: Yes)    Herpes zoster without complication (POA: Unknown)    Oropharyngeal dysphagia (POA: Unknown)  Resolved Problems:    * No resolved hospital problems. *      FOLLOW UP  No future appointments.  RICA Lawrence  [address]  [facility] [id number] [phone]    Follow up        MEDICATIONS ON DISCHARGE     Medication List        START taking these medications        Instructions   lansoprazole 30 MG Tbdd  Start taking on: June 24, 2025  Commonly known as: Prevacid   1 Tablet by Enteral Tube route every day.  Dose: 30 mg     NS 0.9 % SOLN 100 mL with acyclovir 50 MG/ML SOLN 546 mg   Infuse 546 mg into a venous catheter every 8 hours for 13 days.  Dose: 10 mg/kg            CONTINUE taking these medications        Instructions   amLODIPine 10 MG Tabs  Commonly known as: Norvasc   Take 10 mg by mouth every day.  Dose: 10 mg     atorvastatin 20 MG Tabs  Commonly known as: Lipitor   Take 20 mg by mouth every day.  Dose: 20 mg     finasteride 5 MG Tabs  Commonly known as: Proscar   Take 5 mg by mouth every day.  Dose: 5 mg     hydroCHLOROthiazide 25 MG Tabs   Take 25 mg by mouth every day.  Dose: 25 mg     losartan 100 MG Tabs  Commonly known as: Cozaar   Take 100 mg by mouth every day.  Dose: 100 mg            STOP taking these medications      metoprolol  MG Tb24  Commonly known as: Toprol XL     omeprazole 20 MG delayed-release capsule  Commonly known as: PriLOSEC     oxybutynin 5 MG Tabs  Commonly known as: Ditropan     tamsulosin 0.4 MG capsule  Commonly known as: Flomax              Allergies  Allergies[1]    DIET  Orders Placed This Encounter   Procedures    Diet: Diet Tube Feed; Formula: Promote; Promote: Promote Fiber RTH; Goal Rate (mL/Hour): 60; Duration: 24 HR     Start at 25mL/hr and advance per protocol to goal rate     Standing Status:    Standing     Number of Occurrences:   1     Diet:   Diet Tube Feed [35]     Formula::   Promote     Promote::   Promote Fiber RTH     Goal Rate (mL/Hour):   60     Route:   Enteral Tube     Duration:   24 HR       ACTIVITY  As tolerated.  Weight bearing as tolerated    LINES, DRAINS, AND WOUNDS  This is an automated list. Peripheral IVs will be removed prior to discharge.  Peripheral IV 06/19/25 20 G Anterior;Left Forearm (Active)   Site Assessment Clean;Dry;Intact 06/22/25 2000   Dressing Type Transparent 06/22/25 2000   Line Status Flushed;Scrubbed the hub prior to access;Saline locked 06/22/25 2000   Dressing Status Clean;Dry;Intact 06/22/25 2000   Dressing Intervention Dressing changed per protocol 06/22/25 0500   Dressing Change Due 06/28/25 06/22/25 0500   Date Primary Tubing Changed 06/21/25 06/21/25 0500   Date IV Connector(s) Changed 06/19/25 06/19/25 1252   Infiltration Grading ([facility], [facility]) 0 06/22/25 0800   Phlebitis Scale ([facility] Only) 0 06/22/25 0800       Peripheral IV 06/23/25 20 G Anterior;Proximal;Right Forearm (Active)   Site Assessment Clean;Dry;Intact 06/23/25 0440   Dressing Type Transparent 06/23/25 0440   Line Status Blood return noted;Flushed;Scrubbed the hub prior to access;Saline locked 06/23/25 0440   Dressing Status Clean;Dry;Intact 06/23/25 0440   Dressing Intervention Initial dressing 06/23/25 0440     Rectal Tube (Active)   Skin Care Area cleansed;Protective Barrier Applied 06/23/25 0400   Skin Integrity Red 06/23/25 0400   Flushed Per Protocol Yes 06/23/25 0400   Balloon Inflated (mL) 30 06/21/25 0600   Rectal Tube Output 600 mL 06/23/25 0400       External Urinary Catheter (Condom) (Active)   Collection Container Standard drainage bag 06/22/25 2000   Output (mL) 525 mL 06/22/25 1600      Wound 06/19/25 Atypical Thigh Proximal;Anterior Right Scattered vesicles (Active)   Wound Image   06/21/25 0400   Site Assessment Red 06/23/25 0800   Periwound Assessment Clean;Dry;Intact;Pink  06/23/25 0800   Margins Attached edges;Defined edges 06/23/25 0800   Closure Secondary intention 06/22/25 2000   Drainage Amount None 06/23/25 0800   Treatments Site care 06/19/25 0800   Moisture Containment Device Condom Catheter 06/23/25 0800   Dressing Status Clean;Dry;Intact 06/23/25 0800   Dressing Changed Observed 06/23/25 0800   Dressing Cleansing/Solutions Not Applicable 06/23/25 0400   Dressing Options Petrolatum Gauze (yellow);Sacral Dressing - Offloading 06/23/25 0400   Dressing Change/Treatment Frequency Every 48 hrs, and As Needed 06/23/25 0800   NEXT Dressing Change/Treatment Date 06/25/25 06/23/25 0800   NEXT Weekly Photo (Inpatient Only) 06/26/25 06/19/25 1735   Wound Team Following Not following 06/19/25 1735   Non-staged Wound Description Partial thickness 06/19/25 1735       Wound 06/21/25 Perineum;Sacrum;Scrotum Redness/excoriation (Active)   Site Assessment Red 06/23/25 0800   Periwound Assessment Clean;Dry;Intact 06/23/25 0800   Drainage Amount None 06/23/25 0800   Treatments Cleansed;Site care 06/23/25 0400   Wound Cleansing Dakin's Solution 06/23/25 0400   Periwound Protectant Barrier Paste 06/23/25 0400   Moisture Containment Device Condom Catheter 06/23/25 0400   Dressing Status Open to Air 06/23/25 0800       Peripheral IV 06/19/25 20 G Anterior;Left Forearm (Active)   Site Assessment Clean;Dry;Intact 06/22/25 2000   Dressing Type Transparent 06/22/25 2000   Line Status Flushed;Scrubbed the hub prior to access;Saline locked 06/22/25 2000   Dressing Status Clean;Dry;Intact 06/22/25 2000   Dressing Intervention Dressing changed per protocol 06/22/25 0500   Dressing Change Due 06/28/25 06/22/25 0500   Date Primary Tubing Changed 06/21/25 06/21/25 0500   Date IV Connector(s) Changed 06/19/25 06/19/25 1252   Infiltration Grading ([facility], [facility]) 0 06/22/25 0800   Phlebitis Scale ([facility] Only) 0 06/22/25 0800       Peripheral IV 06/23/25 20 G Anterior;Proximal;Right Forearm (Active)   Site  Assessment Clean;Dry;Intact 06/23/25 0440   Dressing Type Transparent 06/23/25 0440   Line Status Blood return noted;Flushed;Scrubbed the hub prior to access;Saline locked 06/23/25 0440   Dressing Status Clean;Dry;Intact 06/23/25 0440   Dressing Intervention Initial dressing 06/23/25 0440               MENTAL STATUS ON TRANSFER             CONSULTATIONS  ICU  neurology    PROCEDURES  na    LABORATORY  Lab Results   Component Value Date    SODIUM 140 06/23/2025    POTASSIUM 3.2 (L) 06/23/2025    CHLORIDE 101 06/23/2025    CO2 31 06/23/2025    GLUCOSE 154 (H) 06/23/2025    BUN 14 06/23/2025    CREATININE 0.78 06/23/2025        Lab Results   Component Value Date    WBC 10.7 06/23/2025    HEMOGLOBIN 9.3 (L) 06/23/2025    HEMATOCRIT 28.9 (L) 06/23/2025    PLATELETCT 249 06/23/2025      DX-ABDOMEN FOR TUBE PLACEMENT   Final Result         1. Feeding tube tip in distal stomach.                     DX-ABDOMEN FOR TUBE PLACEMENT   Final Result      Enteric tube tip projects at the distal esophagus or proximal stomach. Recommend advancing.      DX-ABDOMEN FOR TUBE PLACEMENT   Final Result      NG tube tip projects at the stomach.      MR-BRAIN-WITH & W/O   Final Result      1.  Age-related cerebral atrophy.   2.  Minimal periventricular white matter changes consistent with chronic microvascular ischemic gliosis.   3.  Small neuroepithelial cyst or chronic area of lacunar infarction in the left subinsular cortex.      DX-ABDOMEN FOR TUBE PLACEMENT   Final Result         1.  Nonspecific bowel gas pattern in the upper abdomen.   2.  Nasogastric tube is coiled within the stomach, the tip terminates overlying the expected location of the gastric cardia.      DX-CHEST-PORTABLE (1 VIEW)   Final Result         1.  Hazy left lower lobe infiltrates.   2.  Cardiomegaly   3.  Atherosclerosis      DX-ABDOMEN FOR TUBE PLACEMENT   Final Result      Gastric tube terminates in the proximal stomach.      DX-CHEST-PORTABLE (1 VIEW)   Final  Result         1.  No acute cardiopulmonary disease.   2.  Cardiomegaly   3.  Atherosclerosis      IR-US GUIDED PIV    (Results Pending)       Total time of the discharge process 35 minutes.       [1] Not on File